# Patient Record
Sex: MALE | Race: WHITE | NOT HISPANIC OR LATINO | Employment: OTHER | URBAN - METROPOLITAN AREA
[De-identification: names, ages, dates, MRNs, and addresses within clinical notes are randomized per-mention and may not be internally consistent; named-entity substitution may affect disease eponyms.]

---

## 2018-02-10 ENCOUNTER — APPOINTMENT (OUTPATIENT)
Dept: GENERAL RADIOLOGY | Facility: CLINIC | Age: 53
End: 2018-02-10
Attending: EMERGENCY MEDICINE
Payer: COMMERCIAL

## 2018-02-10 ENCOUNTER — HOSPITAL ENCOUNTER (EMERGENCY)
Facility: CLINIC | Age: 53
Discharge: ANOTHER HOSPITAL | End: 2018-02-10
Attending: EMERGENCY MEDICINE | Admitting: EMERGENCY MEDICINE
Payer: COMMERCIAL

## 2018-02-10 ENCOUNTER — HOSPITAL ENCOUNTER (INPATIENT)
Facility: CLINIC | Age: 53
LOS: 3 days | Discharge: HOME | DRG: 247 | End: 2018-02-13
Attending: INTERNAL MEDICINE | Admitting: INTERNAL MEDICINE
Payer: COMMERCIAL

## 2018-02-10 VITALS
OXYGEN SATURATION: 96 % | HEIGHT: 69 IN | WEIGHT: 238.38 LBS | SYSTOLIC BLOOD PRESSURE: 142 MMHG | TEMPERATURE: 98.3 F | RESPIRATION RATE: 13 BRPM | HEART RATE: 73 BPM | DIASTOLIC BLOOD PRESSURE: 103 MMHG | BODY MASS INDEX: 35.31 KG/M2

## 2018-02-10 DIAGNOSIS — I24.9 ACS (ACUTE CORONARY SYNDROME) (H): ICD-10-CM

## 2018-02-10 DIAGNOSIS — I24.9 ACS (ACUTE CORONARY SYNDROME) (H): Primary | ICD-10-CM

## 2018-02-10 DIAGNOSIS — R03.0 ELEVATED BLOOD PRESSURE READING WITHOUT DIAGNOSIS OF HYPERTENSION: ICD-10-CM

## 2018-02-10 LAB
ALBUMIN SERPL-MCNC: 4 G/DL (ref 3.4–5)
ALP SERPL-CCNC: 70 U/L (ref 40–150)
ALT SERPL W P-5'-P-CCNC: 60 U/L (ref 0–70)
ANION GAP SERPL CALCULATED.3IONS-SCNC: 6 MMOL/L (ref 3–14)
AST SERPL W P-5'-P-CCNC: 37 U/L (ref 0–45)
BASOPHILS # BLD AUTO: 0.1 10E9/L (ref 0–0.2)
BASOPHILS NFR BLD AUTO: 0.8 %
BILIRUB SERPL-MCNC: 0.9 MG/DL (ref 0.2–1.3)
BUN SERPL-MCNC: 13 MG/DL (ref 7–30)
CALCIUM SERPL-MCNC: 9.2 MG/DL (ref 8.5–10.1)
CHLORIDE SERPL-SCNC: 107 MMOL/L (ref 94–109)
CO2 SERPL-SCNC: 25 MMOL/L (ref 20–32)
CREAT SERPL-MCNC: 0.72 MG/DL (ref 0.66–1.25)
DIFFERENTIAL METHOD BLD: NORMAL
EOSINOPHIL # BLD AUTO: 0.3 10E9/L (ref 0–0.7)
EOSINOPHIL NFR BLD AUTO: 5.3 %
ERYTHROCYTE [DISTWIDTH] IN BLOOD BY AUTOMATED COUNT: 12.8 % (ref 10–15)
ERYTHROCYTE [DISTWIDTH] IN BLOOD BY AUTOMATED COUNT: 13.3 % (ref 10–15)
GFR SERPL CREATININE-BSD FRML MDRD: >90 ML/MIN/1.7M2
GLUCOSE SERPL-MCNC: 103 MG/DL (ref 70–99)
HCT VFR BLD AUTO: 43.1 % (ref 40–53)
HCT VFR BLD AUTO: 47.8 % (ref 40–53)
HGB BLD-MCNC: 14.9 G/DL (ref 13.3–17.7)
HGB BLD-MCNC: 16.8 G/DL (ref 13.3–17.7)
IMM GRANULOCYTES # BLD: 0 10E9/L (ref 0–0.4)
IMM GRANULOCYTES NFR BLD: 0.3 %
INR PPP: 1.01 (ref 0.86–1.14)
LIPASE SERPL-CCNC: 141 U/L (ref 73–393)
LMWH PPP CHRO-ACNC: 0.26 IU/ML
LYMPHOCYTES # BLD AUTO: 2 10E9/L (ref 0.8–5.3)
LYMPHOCYTES NFR BLD AUTO: 31.8 %
MCH RBC QN AUTO: 29.9 PG (ref 26.5–33)
MCH RBC QN AUTO: 30.4 PG (ref 26.5–33)
MCHC RBC AUTO-ENTMCNC: 34.6 G/DL (ref 31.5–36.5)
MCHC RBC AUTO-ENTMCNC: 35.1 G/DL (ref 31.5–36.5)
MCV RBC AUTO: 87 FL (ref 78–100)
MCV RBC AUTO: 87 FL (ref 78–100)
MONOCYTES # BLD AUTO: 0.5 10E9/L (ref 0–1.3)
MONOCYTES NFR BLD AUTO: 8.3 %
NEUTROPHILS # BLD AUTO: 3.4 10E9/L (ref 1.6–8.3)
NEUTROPHILS NFR BLD AUTO: 53.5 %
NRBC # BLD AUTO: 0 10*3/UL
NRBC BLD AUTO-RTO: 0 /100
PLATELET # BLD AUTO: 218 10E9/L (ref 150–450)
PLATELET # BLD AUTO: 222 10E9/L (ref 150–450)
POTASSIUM SERPL-SCNC: 3.9 MMOL/L (ref 3.4–5.3)
PROT SERPL-MCNC: 8 G/DL (ref 6.8–8.8)
RBC # BLD AUTO: 4.98 10E12/L (ref 4.4–5.9)
RBC # BLD AUTO: 5.52 10E12/L (ref 4.4–5.9)
SODIUM SERPL-SCNC: 138 MMOL/L (ref 133–144)
TROPONIN I BLD-MCNC: 1.31 UG/L (ref 0–0.1)
TROPONIN I SERPL-MCNC: 1.71 UG/L (ref 0–0.04)
TROPONIN I SERPL-MCNC: 4.27 UG/L (ref 0–0.04)
WBC # BLD AUTO: 6.4 10E9/L (ref 4–11)
WBC # BLD AUTO: 8 10E9/L (ref 4–11)

## 2018-02-10 PROCEDURE — 96376 TX/PRO/DX INJ SAME DRUG ADON: CPT

## 2018-02-10 PROCEDURE — 99285 EMERGENCY DEPT VISIT HI MDM: CPT | Mod: 25

## 2018-02-10 PROCEDURE — 36415 COLL VENOUS BLD VENIPUNCTURE: CPT | Performed by: PHYSICIAN ASSISTANT

## 2018-02-10 PROCEDURE — 25000132 ZZH RX MED GY IP 250 OP 250 PS 637: Performed by: PHYSICIAN ASSISTANT

## 2018-02-10 PROCEDURE — 83690 ASSAY OF LIPASE: CPT | Performed by: EMERGENCY MEDICINE

## 2018-02-10 PROCEDURE — 85027 COMPLETE CBC AUTOMATED: CPT | Performed by: PHYSICIAN ASSISTANT

## 2018-02-10 PROCEDURE — 25000128 H RX IP 250 OP 636: Performed by: PHYSICIAN ASSISTANT

## 2018-02-10 PROCEDURE — 25000128 H RX IP 250 OP 636: Performed by: EMERGENCY MEDICINE

## 2018-02-10 PROCEDURE — 36415 COLL VENOUS BLD VENIPUNCTURE: CPT | Performed by: INTERNAL MEDICINE

## 2018-02-10 PROCEDURE — 84484 ASSAY OF TROPONIN QUANT: CPT | Performed by: EMERGENCY MEDICINE

## 2018-02-10 PROCEDURE — 93005 ELECTROCARDIOGRAM TRACING: CPT

## 2018-02-10 PROCEDURE — 25000132 ZZH RX MED GY IP 250 OP 250 PS 637: Performed by: EMERGENCY MEDICINE

## 2018-02-10 PROCEDURE — 96365 THER/PROPH/DIAG IV INF INIT: CPT

## 2018-02-10 PROCEDURE — 99207 ZZC MOONLIGHTING INDICATOR: CPT | Performed by: PHYSICIAN ASSISTANT

## 2018-02-10 PROCEDURE — 99207 ZZC APP CREDIT; MD BILLING SHARED VISIT: CPT | Performed by: PHYSICIAN ASSISTANT

## 2018-02-10 PROCEDURE — 96375 TX/PRO/DX INJ NEW DRUG ADDON: CPT

## 2018-02-10 PROCEDURE — 21000000 ZZH R&B IMCU HEART CARE

## 2018-02-10 PROCEDURE — 25000128 H RX IP 250 OP 636

## 2018-02-10 PROCEDURE — 85520 HEPARIN ASSAY: CPT | Performed by: INTERNAL MEDICINE

## 2018-02-10 PROCEDURE — 85610 PROTHROMBIN TIME: CPT | Performed by: EMERGENCY MEDICINE

## 2018-02-10 PROCEDURE — 93010 ELECTROCARDIOGRAM REPORT: CPT | Performed by: INTERNAL MEDICINE

## 2018-02-10 PROCEDURE — 71045 X-RAY EXAM CHEST 1 VIEW: CPT

## 2018-02-10 PROCEDURE — 99223 1ST HOSP IP/OBS HIGH 75: CPT | Mod: AI | Performed by: INTERNAL MEDICINE

## 2018-02-10 PROCEDURE — 96361 HYDRATE IV INFUSION ADD-ON: CPT

## 2018-02-10 PROCEDURE — 93005 ELECTROCARDIOGRAM TRACING: CPT | Mod: 76

## 2018-02-10 PROCEDURE — 85025 COMPLETE CBC W/AUTO DIFF WBC: CPT | Performed by: EMERGENCY MEDICINE

## 2018-02-10 PROCEDURE — 84484 ASSAY OF TROPONIN QUANT: CPT | Performed by: PHYSICIAN ASSISTANT

## 2018-02-10 PROCEDURE — 84484 ASSAY OF TROPONIN QUANT: CPT

## 2018-02-10 PROCEDURE — 96366 THER/PROPH/DIAG IV INF ADDON: CPT

## 2018-02-10 PROCEDURE — 80053 COMPREHEN METABOLIC PANEL: CPT | Performed by: EMERGENCY MEDICINE

## 2018-02-10 RX ORDER — MORPHINE SULFATE 2 MG/ML
4 INJECTION, SOLUTION INTRAMUSCULAR; INTRAVENOUS ONCE
Status: DISCONTINUED | OUTPATIENT
Start: 2018-02-10 | End: 2018-02-10 | Stop reason: HOSPADM

## 2018-02-10 RX ORDER — NITROGLYCERIN 20 MG/100ML
INJECTION INTRAVENOUS
Status: DISCONTINUED
Start: 2018-02-10 | End: 2018-02-10 | Stop reason: HOSPADM

## 2018-02-10 RX ORDER — ONDANSETRON 2 MG/ML
4 INJECTION INTRAMUSCULAR; INTRAVENOUS EVERY 6 HOURS PRN
Status: DISCONTINUED | OUTPATIENT
Start: 2018-02-10 | End: 2018-02-13 | Stop reason: HOSPADM

## 2018-02-10 RX ORDER — SODIUM CHLORIDE 9 MG/ML
1000 INJECTION, SOLUTION INTRAVENOUS CONTINUOUS
Status: DISCONTINUED | OUTPATIENT
Start: 2018-02-10 | End: 2018-02-10 | Stop reason: HOSPADM

## 2018-02-10 RX ORDER — NITROGLYCERIN 20 MG/100ML
.07-1.76 INJECTION INTRAVENOUS CONTINUOUS
Status: DISCONTINUED | OUTPATIENT
Start: 2018-02-10 | End: 2018-02-12

## 2018-02-10 RX ORDER — MORPHINE SULFATE 4 MG/ML
4 INJECTION, SOLUTION INTRAMUSCULAR; INTRAVENOUS ONCE
Status: COMPLETED | OUTPATIENT
Start: 2018-02-10 | End: 2018-02-10

## 2018-02-10 RX ORDER — LIDOCAINE 40 MG/G
CREAM TOPICAL
Status: DISCONTINUED | OUTPATIENT
Start: 2018-02-10 | End: 2018-02-12

## 2018-02-10 RX ORDER — NALOXONE HYDROCHLORIDE 0.4 MG/ML
.1-.4 INJECTION, SOLUTION INTRAMUSCULAR; INTRAVENOUS; SUBCUTANEOUS
Status: DISCONTINUED | OUTPATIENT
Start: 2018-02-10 | End: 2018-02-12

## 2018-02-10 RX ORDER — MORPHINE SULFATE 2 MG/ML
1 INJECTION, SOLUTION INTRAMUSCULAR; INTRAVENOUS
Status: DISCONTINUED | OUTPATIENT
Start: 2018-02-10 | End: 2018-02-12

## 2018-02-10 RX ORDER — NITROGLYCERIN 0.4 MG/1
0.4 TABLET SUBLINGUAL
Status: COMPLETED | OUTPATIENT
Start: 2018-02-10 | End: 2018-02-10

## 2018-02-10 RX ORDER — ALUMINA, MAGNESIA, AND SIMETHICONE 2400; 2400; 240 MG/30ML; MG/30ML; MG/30ML
30 SUSPENSION ORAL EVERY 4 HOURS PRN
Status: DISCONTINUED | OUTPATIENT
Start: 2018-02-10 | End: 2018-02-13 | Stop reason: HOSPADM

## 2018-02-10 RX ORDER — ATORVASTATIN CALCIUM 40 MG/1
40 TABLET, FILM COATED ORAL DAILY
Status: DISCONTINUED | OUTPATIENT
Start: 2018-02-10 | End: 2018-02-13

## 2018-02-10 RX ORDER — PROCHLORPERAZINE MALEATE 5 MG
10 TABLET ORAL EVERY 6 HOURS PRN
Status: DISCONTINUED | OUTPATIENT
Start: 2018-02-10 | End: 2018-02-13 | Stop reason: HOSPADM

## 2018-02-10 RX ORDER — ASPIRIN 81 MG/1
324 TABLET, CHEWABLE ORAL ONCE
Status: COMPLETED | OUTPATIENT
Start: 2018-02-10 | End: 2018-02-10

## 2018-02-10 RX ORDER — MORPHINE SULFATE 2 MG/ML
INJECTION, SOLUTION INTRAMUSCULAR; INTRAVENOUS
Status: COMPLETED
Start: 2018-02-10 | End: 2018-02-10

## 2018-02-10 RX ORDER — AMOXICILLIN 250 MG
1 CAPSULE ORAL 2 TIMES DAILY PRN
Status: DISCONTINUED | OUTPATIENT
Start: 2018-02-10 | End: 2018-02-13 | Stop reason: HOSPADM

## 2018-02-10 RX ORDER — ASPIRIN 325 MG
325 TABLET ORAL DAILY
Status: DISCONTINUED | OUTPATIENT
Start: 2018-02-11 | End: 2018-02-12

## 2018-02-10 RX ORDER — AMOXICILLIN 250 MG
2 CAPSULE ORAL 2 TIMES DAILY PRN
Status: DISCONTINUED | OUTPATIENT
Start: 2018-02-10 | End: 2018-02-13 | Stop reason: HOSPADM

## 2018-02-10 RX ORDER — ONDANSETRON 4 MG/1
4 TABLET, ORALLY DISINTEGRATING ORAL EVERY 6 HOURS PRN
Status: DISCONTINUED | OUTPATIENT
Start: 2018-02-10 | End: 2018-02-13 | Stop reason: HOSPADM

## 2018-02-10 RX ORDER — ASPIRIN 81 MG/1
324 TABLET, CHEWABLE ORAL ONCE
Status: DISCONTINUED | OUTPATIENT
Start: 2018-02-10 | End: 2018-02-10

## 2018-02-10 RX ORDER — PROCHLORPERAZINE 25 MG
25 SUPPOSITORY, RECTAL RECTAL EVERY 12 HOURS PRN
Status: DISCONTINUED | OUTPATIENT
Start: 2018-02-10 | End: 2018-02-13 | Stop reason: HOSPADM

## 2018-02-10 RX ORDER — ACETAMINOPHEN 650 MG/1
650 SUPPOSITORY RECTAL EVERY 4 HOURS PRN
Status: DISCONTINUED | OUTPATIENT
Start: 2018-02-10 | End: 2018-02-13 | Stop reason: HOSPADM

## 2018-02-10 RX ORDER — METOPROLOL TARTRATE 25 MG/1
25 TABLET, FILM COATED ORAL 2 TIMES DAILY
Status: DISCONTINUED | OUTPATIENT
Start: 2018-02-10 | End: 2018-02-13

## 2018-02-10 RX ORDER — LISINOPRIL 2.5 MG/1
2.5 TABLET ORAL DAILY
Status: DISCONTINUED | OUTPATIENT
Start: 2018-02-10 | End: 2018-02-13 | Stop reason: HOSPADM

## 2018-02-10 RX ORDER — ACETAMINOPHEN 325 MG/1
650 TABLET ORAL EVERY 4 HOURS PRN
Status: DISCONTINUED | OUTPATIENT
Start: 2018-02-10 | End: 2018-02-12

## 2018-02-10 RX ADMIN — NITROGLYCERIN 0.4 MG: 0.4 TABLET SUBLINGUAL at 13:46

## 2018-02-10 RX ADMIN — NITROGLYCERIN 0.07 MCG/KG/MIN: 20 INJECTION INTRAVENOUS at 21:03

## 2018-02-10 RX ADMIN — ATORVASTATIN CALCIUM 40 MG: 40 TABLET, FILM COATED ORAL at 21:04

## 2018-02-10 RX ADMIN — MORPHINE SULFATE 2 MG: 2 INJECTION, SOLUTION INTRAMUSCULAR; INTRAVENOUS at 14:46

## 2018-02-10 RX ADMIN — ASPIRIN 81 MG 324 MG: 81 TABLET ORAL at 13:18

## 2018-02-10 RX ADMIN — MORPHINE SULFATE 4 MG: 4 INJECTION INTRAVENOUS at 13:43

## 2018-02-10 RX ADMIN — HEPARIN SODIUM 1050 UNITS/HR: 10000 INJECTION, SOLUTION INTRAVENOUS at 14:04

## 2018-02-10 RX ADMIN — METOPROLOL TARTRATE 25 MG: 25 TABLET ORAL at 21:04

## 2018-02-10 RX ADMIN — LISINOPRIL 2.5 MG: 2.5 TABLET ORAL at 19:04

## 2018-02-10 RX ADMIN — NITROGLYCERIN 0.4 MG: 0.4 TABLET SUBLINGUAL at 13:38

## 2018-02-10 RX ADMIN — Medication 5150 UNITS: at 14:04

## 2018-02-10 RX ADMIN — NITROGLYCERIN 0.4 MG: 0.4 TABLET SUBLINGUAL at 13:21

## 2018-02-10 RX ADMIN — SODIUM CHLORIDE 1000 ML: 9 INJECTION, SOLUTION INTRAVENOUS at 13:18

## 2018-02-10 RX ADMIN — SODIUM CHLORIDE 1000 ML: 9 INJECTION, SOLUTION INTRAVENOUS at 14:06

## 2018-02-10 ASSESSMENT — ENCOUNTER SYMPTOMS
WEAKNESS: 1
SHORTNESS OF BREATH: 1
NAUSEA: 0

## 2018-02-10 NOTE — ED PROVIDER NOTES
"  History     Chief Complaint:  Chest Pain and Shortness of Breath      HPI   Lavell Mercer is a 52 year old male who presents with chest pain and shortness of breath. The patient reports that two days ago in the morning, he began feeling chest discomfort, which he describes as \"muscle tightness\".  He also felt like he was getting sick. The symptoms then resolved a couple of hours later.  Then yesterday, he felt fatigued and mildly congested. This morning, at 0700 he awoke to chest pains. The chest pain remained constant, with sudden episodes of sharp pain, with no aggravating or alleviating factors. Around 0930, he tried to open a top of bottle to prepare a shake when he felt his left arm was weak. He was still able to open the top and was still able to  with his left hand. He did not notice any numbness in his arm, and after several minutes the weakness resolved. He then felt increased fatigue, shortness of breath and \"warm\", although he did not experience any diaphoresis. He was helping family members move a chair and he noticed his chest pain was worsened after that. His continued chest pain and shortness of breath prompted today's visit to the emergency department.    Here now in the emergency department, he reports continued chest pain. He denies nausea. He also denies any recent falls or recent travel. Of note, he started a diet about 2 weeks ago and has lost 16 pounds.      Allergies:  NKDA     Medications:    The patient is currently on no regular medications.      Past Medical History:    The patient denies any significant past medical history.    Past Surgical History:    The patient does not have any pertinent past surgical history  Family History:    No past pertinent family history.     Social History:  The patient presents with his wife.   Marital Status:   [2]    Review of Systems   Respiratory: Positive for shortness of breath.    Cardiovascular: Positive for chest pain. " "  Gastrointestinal: Negative for nausea.   Neurological: Positive for weakness (Resolved: left arm weakness).   All other systems reviewed and are negative.      Physical Exam   First Vitals:  BP (!) 134/98  Pulse 73  Temp 98.3  F (36.8  C) (Oral)  Resp 16  Ht 1.753 m (5' 9\")  Wt 108.1 kg (238 lb 6 oz)  SpO2 93%  BMI 35.2 kg/m2    Physical Exam  General: The patient is alert, in no respiratory distress.    HENT: Mucous membranes moist.    Cardiovascular: Regular rate and rhythm. Good pulses in all four extremities. Normal capillary refill and skin turgor.     Respiratory: Lungs are clear. No nasal flaring. No retractions. No wheezing, no crackles.    Gastrointestinal: Abdomen soft. No guarding, no rebound. No palpable hernias. No abdominal tenderness.     Musculoskeletal: No gross deformity. Pain with taking a deep breath.    Skin: No rashes or petechiae.     Neurologic: The patient is alert and oriented x3. GCS 15. No testable cranial nerve deficit. Follows commands with clear and appropriate speech. Gives appropriate answers. Good strength in all extremities. No gross neurologic deficit. Gross sensation intact. Pupils are round and reactive. No meningismus.     Lymphatic: No cervical adenopathy. No lower extremity swelling.    Psychiatric: The patient is non-tearful.    Emergency Department Course   ECG:  Indication: Chest Pain   Time: 1245  Vent. Rate 76 bpm. SD interval 196 ms. QRS duration 104 ms. QT/QTc 376/423 ms. P-R-T axis 47 -31 5. Normal sinus rhythm. Left axis deviation. Inferior infarct, age undetermined. Abnormal ECG. Read time: 1247    Indication: Repeat  Time: 73  Vent. Rate 182 ms bpm. SD interval 104 ms. QRS duration 402/442 ms. QT/QTc 11. P-R-T axis -36 4 Normal sinus rhythm. Left axis deviation. Inferior infarct, age undetermined. Abnormal ECG. Stable. Read time: 1340    Indication: Repeat.   Time: 1400  Vent. Rate 56 bpm. SD interval 190 ms. QRS duration 106 ms. QT/QTc 450/434 ms. P-R-T " axis 52 -21 2. Sinus bradycardia. Inferior infarct, age undetermined. Abnormal ECG. Read time: 1405.     Imaging:  Radiographic findings were communicated with the patient and family who voiced understanding of the findings.    XR Chest Portable 1 view:   Negative. As per radiology.     Laboratory:  1321 Troponin POCT: 1.31 ()  1325 INR: 1.01   Troponin I: 1.708 ()    CBC: WBC: 6.4, HGB: 16.8, PLT: 222  CMP: Glucose 130 (H) o/w WNL (Creatinine: 0.72)  Lipase: 141    Interventions:  1318 NS 1L IV   Aspirin, 324 mg, PO  1343 Morphine, 4 mg, IV  1346 Nitrostat, 0.4 mg, Sublingual  1404 Heparin Infusion, 1050 units/hr, IV   Heparin, 5150 units, IV  1406 NS 1L IV  1446 Morphine, 2 mg, IV    Emergency Department Course:  Nursing notes and vitals reviewed. 1250 I performed an exam of the patient as documented above.     IV inserted. Medicine administered as documented above. Blood drawn. This was sent to the lab for further testing, results above.    EKG x 3 obtained in the ED, see results above.     The patient was sent for a Chest XR, Portable while in the emergency department, findings above.     1333 I rechecked the patient and discussed the results of his workup thus far. I discussed his elevated Troponin. He is 5/10 on the pain scale.    1415 I reevaluated the patient and provided an update in regards to his ED course. He reports he is 1.5/10 on the pain scale.      1457 I performed another recheck.     1505 I consulted with Dr. Ashley, Cardiologist, regarding the patient's history and presentation here in the emergency department. He recommended the patient for a transfer.     1508 I again rechecked the patient and updated him regarding his ED course.     1522 I consulted with Dr. David, Cardiologist, regarding the patient's history and presentation here in the emergency department. He would not cath emergently.     1530 I consulted with Dr. Sandy of the hospitalist services at [facility]. They are  "in agreement to accept the patient for transfer.     Findings and plan explained to the Patient who consents to transfer to [facility].     Impression & Plan      Medical Decision Making:  The patient has few medical problems in his history but did report an episode of pain two days ago with what he said he felt like was the flu or \"crud\". The patient's pain has since come back and sounds very persistent. The fact that it was worse with a deep breath and he was able to move furniture without increased pain made me think it was less likely to be ACS. However, lead three on the EKG showed less than a box of elevation and was potentially suspicious for a problem. I discussed with the pain having been there almost six hours, that I would expect more significant EKG changes but did embark on a work-up and his Troponin did come back elevated. I therefore did treatment aggressively with nitroglycerin however when dosed with morphine this did drop his pressure. I therefore have waited for further nitroglycerin and he appeared to be quite sensitive to it. With morphine his pain was almost completely gone. The EKG were performed serially and did not show any significant elevation. I discussed the case with the cardiologist here at [facility], Dr. Ashley as well as Dr. David at [facility]. Dr. Ashley recommended transfer if the pain was not completely gone and that he may need emergent cardiac catheterization. Dr. David felt that immediate catheterization was not needed but did agree to be involved in the patient's care. The case was discussed with the hospitalist at [facility] and the patient was transferred with no current signs of ST elevation MI. There is some elevation that does appear to meet criteria but does not decrease significantly with treatment. His pain almost completely gone and he was transferred to [facility] mainly because they have a cath lab available should he become a candidate for an emergent cath if " needed and the recommendation of the cardiologist.      Critical Care time:  was 50 minutes for this patient excluding procedures.    Diagnosis:    ICD-10-CM   1. ACS (acute coronary syndrome) (H) I24.9   2. Elevated blood pressure reading without diagnosis of hypertension R03.0       Disposition:  Transfer to Dr. Sandy, hospitalist at [facility].     I, Estephanie Torres, am serving as a scribe on 2/10/2018 at 12:42 PM to personally document services performed by Flo Blake MD based on my observations and the provider's statements to me.       Estephanie Torres  2/10/2018   [facility] EMERGENCY DEPARTMENT       Flo Blake MD  02/11/18 8970

## 2018-02-10 NOTE — IP AVS SNAPSHOT
MRN:[id number]                      After Visit Summary   2/10/2018    Lavell Mercer    MRN: [id number]           Thank you!     Thank you for choosing [facility] for your care. Our goal is always to provide you with excellent care. Hearing back from our patients is one way we can continue to improve our services. Please take a few minutes to complete the written survey that you may receive in the mail after you visit with us. Thank you!        Patient Information     Date Of Birth          1965        Designated Caregiver       Most Recent Value    Caregiver    Will someone help with your care after discharge? yes    Name of designated caregiver Angeles Mercer    Phone number of caregiver [phone]    Caregiver address [address], [address]      About your hospital stay     You were [facility] on:  February 10, 2018 [facility]    You were discharged on:  February 13, 2018        Reason for your hospital stay       Coronary artery disease.                  Who to Call     For medical emergencies, please call 911.  For non-urgent questions about your medical care, please call your primary care provider or clinic, [phone]          Attending Provider     Provider Specialty    Altaf Guerin MD Internal Medicine       Primary Care Provider Office Phone # Fax #    Hipolito Le [phone]      After Care Instructions     Activity       Your activity upon discharge: activity as tolerated            Diet       Follow this diet upon discharge: Orders Placed This Encounter      Low Saturated Fat Na <2400 mg                  Follow-up Appointments     Follow-up and recommended labs and tests        Follow up with primary care provider, Hipolito Le, within 7 days for hospital follow- up.  The following labs/tests are recommended: cbc/bmp.    Follow up with Cardiology as directed.            Follow-up and  recommended labs and tests        Please follow-up with your Primary Care Physician:  Hipolito Lincoln   [phone] [facility] [address] [address] [facility]  Tuesday, February 20, 2018  10:15 AM                  Your next 10 appointments already scheduled     Feb 20, 2018 10:15 AM CST   Cardiac Evaluation with  Cardiac Rehab 1   [facility] ([facility])    [address], [address]  [place] [id number] [phone]            Feb 27, 2018 11:00 AM CST   Return Discharge with Raeann Mccurdy PA-C   [facility] ([facility])    [address]  [place] [address] [phone]              Additional Services     CARDIAC REHAB REFERRAL       Patient may choose their preference of the site for Cardiac Rehab.            Follow-Up with Cardiac Advanced Practice Provider                 Further instructions from your care team       PLEASE REVIEW YOUR CORONARY ANGIOGRAM, ANGIOPLASTY AND STENT PLACEMENT BOOKLET AND YOUR HEART MEDICATION HANDOUT.    Pending Results     Date and Time Order Name Status Description    2/12/2018 1827 EKG 12-lead, tracing only Preliminary     2/12/2018 1516 EKG 12-lead, tracing only  Preliminary     2/12/2018 0858 EKG 12-lead, tracing only Preliminary     2/10/2018 1839 EKG 12-lead, tracing only Preliminary             Statement of Approval     Ordered          02/13/18 1148  I have reviewed and agree with all the recommendations and orders detailed in this document.  EFFECTIVE NOW     Approved and electronically signed by:  Bunny Aguiar DO             Admission Information     Date & Time Provider Department Dept. Phone    2/10/2018 Altaf Guerin MD [facility] [phone]      Your Vitals Were     Blood Pressure Temperature Respirations Height Weight Pulse Oximetry    135/88 98.6  F (37  C) (Oral) 16 1.753 m (5'  "9\") 111.6 kg (246 lb 1.6 oz) 98%    BMI (Body Mass Index)                   36.34 kg/m2           My Health Direct Information     My Health Direct lets you send messages to your doctor, view your test results, renew your prescriptions, schedule appointments and more. To sign up, go to [email or web address] . Click on \"Log in\" on the left side of the screen, which will take you to the Welcome page. Then click on \"Sign up Now\" on the right side of the page.     You will be asked to enter the access code listed below, as well as some personal information. Please follow the directions to create your username and password.     Your access code is: [email or web address]  Expires: 2018  7:44 AM     Your access code will  in 90 days. If you need help or a new code, please call your [place] clinic or [phone].        Care EveryWhere ID     This is your Care EveryWhere ID. This could be used by other organizations to access your [place] medical records  [id number]        Equal Access to Services     [facility] : Hadii hai Ascencio, waalicia pace, qachris castanoalmariela domínguez, diogo krishnan. So [facility] [phone].    ATENCIÓN: Si habla español, tiene a curry disposición servicios gratuitos de asistencia lingüística. Mya al [phone].    We comply with applicable federal civil rights laws and [place] laws. We do not discriminate on the basis of race, color, national origin, age, disability, sex, sexual orientation, or gender identity.               Review of your medicines      START taking        Dose / Directions    aspirin 81 MG EC tablet        Dose:  81 mg   Start taking on:  2018   Take 1 tablet (81 mg) by mouth daily   Quantity:  30 tablet   Refills:  0       atorvastatin 20 MG tablet   Commonly known as:  LIPITOR        Dose:  20 mg   Take 1 tablet (20 mg) by mouth daily   Quantity:  30 tablet   Refills:  0       clopidogrel 75 MG tablet   Commonly known as:  PLAVIX        " Dose:  75 mg   Start taking on:  2/14/2018   Take 1 tablet (75 mg) by mouth daily   Quantity:  30 tablet   Refills:  0       lisinopril 2.5 MG tablet   Commonly known as:  PRINIVIL/Zestril        Dose:  2.5 mg   Start taking on:  2/14/2018   Take 1 tablet (2.5 mg) by mouth daily   Quantity:  30 tablet   Refills:  0       metoprolol tartrate 25 MG tablet   Commonly known as:  LOPRESSOR        Dose:  12.5 mg   Take 0.5 tablets (12.5 mg) by mouth 2 times daily   Quantity:  30 tablet   Refills:  0       nitroGLYcerin 0.4 MG sublingual tablet   Commonly known as:  NITROSTAT        For chest pain place 1 tablet under the tongue every 5 minutes for 3 doses. If symptoms persist 5 minutes after 1st dose call 911.   Quantity:  25 tablet   Refills:  0            Where to get your medicines      These medications were sent to [facility] - [address]  [address] [facility] [address], [place] [address]     Phone:  [phone]     aspirin 81 MG EC tablet    atorvastatin 20 MG tablet    clopidogrel 75 MG tablet    lisinopril 2.5 MG tablet    metoprolol tartrate 25 MG tablet    nitroGLYcerin 0.4 MG sublingual tablet                Protect others around you: Learn how to safely use, store and throw away your medicines at www.disposemymeds.org.             Medication List: This is a list of all your medications and when to take them. Check marks below indicate your daily home schedule. Keep this list as a reference.      Medications           Morning Afternoon Evening Bedtime As Needed    aspirin 81 MG EC tablet   Take 1 tablet (81 mg) by mouth daily   Start taking on:  2/14/2018   Last time this was given:  81 mg on 2/13/2018  8:21 AM   Next Dose Due:  02/14                                   atorvastatin 20 MG tablet   Commonly known as:  LIPITOR   Take 1 tablet (20 mg) by mouth daily   Last time this was given:  40 mg on 2/12/2018  9:39 PM   Next Dose Due:  02/14                                    clopidogrel 75 MG tablet   Commonly known as:  PLAVIX   Take 1 tablet (75 mg) by mouth daily   Start taking on:  2/14/2018   Last time this was given:  75 mg on 2/13/2018  8:21 AM                                   lisinopril 2.5 MG tablet   Commonly known as:  PRINIVIL/Zestril   Take 1 tablet (2.5 mg) by mouth daily   Start taking on:  2/14/2018   Last time this was given:  2.5 mg on 2/13/2018  8:21 AM                                   metoprolol tartrate 25 MG tablet   Commonly known as:  LOPRESSOR   Take 0.5 tablets (12.5 mg) by mouth 2 times daily   Last time this was given:  25 mg on 2/12/2018  8:32 AM   Next Dose Due:  02/13                                   nitroGLYcerin 0.4 MG sublingual tablet   Commonly known as:  NITROSTAT   For chest pain place 1 tablet under the tongue every 5 minutes for 3 doses. If symptoms persist 5 minutes after 1st dose call 911.

## 2018-02-10 NOTE — H&P
[facility]    History and Physical  Hospitalist       Date of Admission:  2/10/2018      Assessment & Plan   Lavell Mercer is a generally healthy 52 year old male who was transferred from [facility] for treatment of a NSTEMI. Troponin 1.31---1.708. Initial EKG with ST elevation in anterior and lateral leads. Subsequent EKG with improvement.  Given 3 doses of SL NTG. When given 3rd dose with morphine became hypotensive (SBP 70s).  Transferred to [facility] for cardiology consult and angiogram.    Summary:    -NSTEMI: Chest pain has been ongoing for two days. Peak troponin 1.708. EKG ST elevation in anterior and lateral leads. Subsequent EKG with improvement.. CXR unremarkable. Started on IV heparin.SBP dropped into 70s after being given SL NTG and morphine. Transferred to [facility] for angiogram.    -Serial troponin, will recheck now. Noting slight worsening of chest pressure.  If upward trending will consult cardiology tonight. If stable, will trial low dose IV NTG infusion.   -IV heparin   -Cardiology consult   -Echocardiogram   -ASA, BB, ACEi, and statin   -Risk factor modification: Check FLP and hgA1c      ADDENDUM: Troponin attila to 4.274. Repeat EKG shows t-wave inversion in inferior leads (unchanged from last). Will continue IV Heparin. Start IV NTG.   If ongoing pain despite titration or ongoing issues with hypotension will consult cardiology this evening. Discussed with RN and Dr. Guerin.    DVT Prophylaxis: IV heparin  Code Status: Full Code (confirmed with wife and RN at bedside)    Disposition: Expected discharge in 2-3 days once cardiac evaluation completed.      Sudha Hanson PA-C  Pager [phone]     This patient was discussed with Dr. Guerin of the Hospitalist Service who agrees with current plans as outlined above.   Inpatient hospital stay recommended given greater than 2 midnight stay and NSTEMI.    Primary Care Physician   Hipolito Le    Chief Complaint    NSTEMI    History is obtained from the patient and outside records    History of Present Illness   Lavell Mecrer is a generally healthy 52 year old male who presented to [facility] ER for evaluation of chest pain. On Thursday morning, he states that he felt unwell. He had some chest pressure that resolve on its own after two hours. Yesterday he again had similar symptoms of feeling unwell that resolved on their own. This morning he woke up with sudden onset of chest pressure and intermittent stabbing episodes of pain.   He went to sleep upright in the chair with no improvement. He then tried to eat and use the restroom. He noted decreased strength in the left arm but not radiating pain. He felt unwell and short of breath. Denies nausea, vomiting, or diaphoresis.  He noted multiple episodes of worsening of his pain with exertion.  He attempted to move a chair with his son but the symptoms worsened. After sitting down to rest they improved.     Upon presentation to [facility], he was having 5/10 chest pressure. Initial SL NTG had minimal change. The second SL NTG improved his pain to 3/10. His troponin returned positive.  He was a given a 3 SL NTG and IV morphine and his systolic blood pressure dropped into the 70s.  His pain improved to a  1.5/10. At that time cardiology was consulted because the initial EKG revealed anterior and lateral ST changes. The repeat ekg showed improvement.  It was recommended that the patient transfer to [facility] in the event an emergent angiogram is needed. The patient was treated with IV heparin.    Upon arrival he noted a mild increase in the pressure to 2/10.  He is not having any stabbing pain. He does state the pain is worse with expiration. Early today he thought he had reproducible chest wall pain on the left but it is not currently present. He has no associated sob, diaphoresis or nausea.    He denies any history of diabetes, hypertension or hyperlipidemia. He gained 50 pounds  following a knee replacement a few years ago. He started a new diet a few weeks ago (protein shakes only, no supplements) and has lost 16 pounds. He previously smoked but quit in 1998. He has no family history of CAD, CVA or cancer.    He and his family had been dealing with ongoing upper respiratory illness since the Suz[place] holiday. He noted he got sick on 12/26 and was started on a 10 day course of antibiotics on 1/2.  His symptoms finally improved approximatly 10 days ago.    Past Medical History    I have reviewed this patient's medical history and updated it with pertinent information if needed.   None    Past Surgical History   I have reviewed this patient's surgical history and updated it with pertinent information if needed.  Past Surgical History:   Procedure Laterality Date     JOINT REPLACEMENT         Prior to Admission Medications   Not currently on any PTA medications     Allergies   No Known Allergies    Social History   I have reviewed this patient's social history and updated it with pertinent information if needed. Lavell Mercer  reports that he quit smoking about 20 years ago. His smoking use included Cigarettes. He does not have any smokeless tobacco history on file. He reports that he drinks alcohol.   previous tobacco use but quit in 1998, rare alcohol use  Family History   I have reviewed this patient's family history and updated it with pertinent information if needed.   No contributory family history    Review of Systems   The 10 point Review of Systems is negative other than noted in the HPI or here.     Physical Exam   Temp: 98.5  F (36.9  C) Temp src: Oral BP: 136/89   Heart Rate: 78 Resp: 16 SpO2: 95 % O2 Device: None (Room air)    Vital Signs with Ranges  Temp:  [98.3  F (36.8  C)-98.5  F (36.9  C)] 98.5  F (36.9  C)  Pulse:  [73] 73  Heart Rate:  [54-80] 78  Resp:  [13-39] 16  BP: (111-153)/() 136/89  SpO2:  [92 %-99 %] 95 %  250 lbs 9.6 oz    Constitutional: Alert  and oriented x3, no acute distress  Eyes: PERRL, EOMs intact  HEENT: patent nares, supple neck  Respiratory: clear to auscultation  Cardiovascular: regular rate and rhythm, no murmurs, no edema, pedal pulses present, no reproducible chest wall tenderness  GI: soft, non tender, non distended, bowel sounds present  Lymph/Hematologic: no evidence of jaundice or bruising  Genitourinary: deferred  Skin: warm and dry, no rashes  Musculoskeletal: able to move all extremities  Neurologic: no focal neurologic deficits, gait not examined  Psychiatric: affect normal, answers questions appropriately    Data   Data reviewed today:  I personally reviewed the EKG tracing showing see HPI (ST segment changes).    Recent Labs  Lab 02/10/18  1823 02/10/18  1325 02/10/18  1321   WBC 8.0 6.4  --    HGB 14.9 16.8  --    MCV 87 87  --     222  --    INR  --  1.01  --    NA  --  138  --    POTASSIUM  --  3.9  --    CHLORIDE  --  107  --    CO2  --  25  --    BUN  --  13  --    CR  --  0.72  --    ANIONGAP  --  6  --    BRAN  --  9.2  --    GLC  --  103*  --    ALBUMIN  --  4.0  --    PROTTOTAL  --  8.0  --    BILITOTAL  --  0.9  --    ALKPHOS  --  70  --    ALT  --  60  --    AST  --  37  --    LIPASE  --  141  --    TROPI 4.274* 1.708*  --    TROPONIN  --   --  1.31*       Imaging:  Recent Results (from the past 24 hour(s))   XR Chest Port 1 View    Narrative    CHEST PORTABLE ONE VIEW   2/10/2018 2:14 PM     HISTORY: Chest pain.    COMPARISON: None.    FINDINGS: Heart size normal. Lungs clear.      Impression    IMPRESSION: Negative.    CHRISTOPHER SOUSA MD

## 2018-02-10 NOTE — ED NOTES
Chest pain that started Thursday; described as tightness. Pain resolved, but patient noted has not been feeling right; tired felt like he was getting ill. Return of pain today at time of waking at 0700 and continued with shortness of breath. Denies that anything he does makes symptoms better or worse.

## 2018-02-10 NOTE — IP AVS SNAPSHOT
[facility]    [address]    [facility] [address]    Phone:  [phone]                                       After Visit Summary   2/10/2018    Lavell Mercer    MRN: [id number]           After Visit Summary Signature Page     I have received my discharge instructions, and my questions have been answered. I have discussed any challenges I see with this plan with the nurse or doctor.    ..........................................................................................................................................  Patient/Patient Representative Signature      ..........................................................................................................................................  Patient Representative Print Name and Relationship to Patient    ..................................................               ................................................  Date                                            Time    ..........................................................................................................................................  Reviewed by Signature/Title    ...................................................              ..............................................  Date                                                            Time

## 2018-02-11 ENCOUNTER — APPOINTMENT (OUTPATIENT)
Dept: CARDIOLOGY | Facility: CLINIC | Age: 53
DRG: 247 | End: 2018-02-11
Attending: PHYSICIAN ASSISTANT
Payer: COMMERCIAL

## 2018-02-11 LAB
CHOLEST SERPL-MCNC: 186 MG/DL
HBA1C MFR BLD: 5.1 % (ref 4.3–6)
HDLC SERPL-MCNC: 24 MG/DL
INTERPRETATION ECG - MUSE: NORMAL
LDLC SERPL CALC-MCNC: 87 MG/DL
LMWH PPP CHRO-ACNC: 0.2 IU/ML
NONHDLC SERPL-MCNC: 162 MG/DL
TRIGL SERPL-MCNC: 373 MG/DL
TROPONIN I SERPL-MCNC: 6.82 UG/L (ref 0–0.04)
TROPONIN I SERPL-MCNC: 7.66 UG/L (ref 0–0.04)

## 2018-02-11 PROCEDURE — 99222 1ST HOSP IP/OBS MODERATE 55: CPT | Mod: 25 | Performed by: INTERNAL MEDICINE

## 2018-02-11 PROCEDURE — 25000128 H RX IP 250 OP 636

## 2018-02-11 PROCEDURE — 93306 TTE W/DOPPLER COMPLETE: CPT

## 2018-02-11 PROCEDURE — 99232 SBSQ HOSP IP/OBS MODERATE 35: CPT | Performed by: INTERNAL MEDICINE

## 2018-02-11 PROCEDURE — 84484 ASSAY OF TROPONIN QUANT: CPT | Performed by: PHYSICIAN ASSISTANT

## 2018-02-11 PROCEDURE — 93306 TTE W/DOPPLER COMPLETE: CPT | Mod: 26 | Performed by: INTERNAL MEDICINE

## 2018-02-11 PROCEDURE — 83036 HEMOGLOBIN GLYCOSYLATED A1C: CPT | Performed by: PHYSICIAN ASSISTANT

## 2018-02-11 PROCEDURE — 25000132 ZZH RX MED GY IP 250 OP 250 PS 637: Performed by: PHYSICIAN ASSISTANT

## 2018-02-11 PROCEDURE — 99207 ZZC CDG-MDM COMPONENT: MEETS MODERATE - UP CODED: CPT | Performed by: INTERNAL MEDICINE

## 2018-02-11 PROCEDURE — 80061 LIPID PANEL: CPT | Performed by: PHYSICIAN ASSISTANT

## 2018-02-11 PROCEDURE — 36415 COLL VENOUS BLD VENIPUNCTURE: CPT | Performed by: PHYSICIAN ASSISTANT

## 2018-02-11 PROCEDURE — 85520 HEPARIN ASSAY: CPT | Performed by: PHYSICIAN ASSISTANT

## 2018-02-11 PROCEDURE — 21000000 ZZH R&B IMCU HEART CARE

## 2018-02-11 PROCEDURE — 25500064 ZZH RX 255 OP 636: Performed by: INTERNAL MEDICINE

## 2018-02-11 RX ADMIN — METOPROLOL TARTRATE 25 MG: 25 TABLET ORAL at 09:06

## 2018-02-11 RX ADMIN — SULFUR HEXAFLUORIDE 5 ML: KIT at 15:21

## 2018-02-11 RX ADMIN — HEPARIN SODIUM 1050 UNITS/HR: 10000 INJECTION, SOLUTION INTRAVENOUS at 09:04

## 2018-02-11 RX ADMIN — LISINOPRIL 2.5 MG: 2.5 TABLET ORAL at 09:06

## 2018-02-11 RX ADMIN — ATORVASTATIN CALCIUM 40 MG: 40 TABLET, FILM COATED ORAL at 21:19

## 2018-02-11 RX ADMIN — ASPIRIN 325 MG ORAL TABLET 325 MG: 325 PILL ORAL at 09:06

## 2018-02-11 RX ADMIN — ACETAMINOPHEN 650 MG: 325 TABLET, FILM COATED ORAL at 00:59

## 2018-02-11 RX ADMIN — ACETAMINOPHEN 650 MG: 325 TABLET, FILM COATED ORAL at 23:48

## 2018-02-11 RX ADMIN — METOPROLOL TARTRATE 25 MG: 25 TABLET ORAL at 21:19

## 2018-02-11 ASSESSMENT — PAIN DESCRIPTION - DESCRIPTORS: DESCRIPTORS: ACHING

## 2018-02-11 NOTE — PROVIDER NOTIFICATION
MD Notification    Notified Person:  MD    Notified Persons Name: Ana Maríamaye MD    Notification Date/Time:  2/11/2018 at 01:15    Notification Interaction:  Talked with Physician    Purpose of Notification:  Troponin level rising; Courtesy call per Dr. Sheridan    Orders Received:  Continue to monitor    Comments:

## 2018-02-11 NOTE — PLAN OF CARE
Problem: Patient Care Overview  Goal: Plan of Care/Patient Progress Review  OT/CR: Order received, chart reviewed, eval attempted. Pt admitted for chest pain. Awaiting cardiology consult with possible angio. RN in agreement to hold OT/CR today.

## 2018-02-11 NOTE — PLAN OF CARE
"Problem: Patient Care Overview  Goal: Plan of Care/Patient Progress Review  Outcome: No Change  Transferred from [facility] at 1745 via ambulance. A/OX4. \"tightness\" on L lower chest. Heparin drip at 10.5ml. Recheck Hep 10a at 815pm. Independent ADLs. LS clear. CMS intact. Due to void. BM this AM. Tele NSR. Low Na/fat diet for now. Need to be NPO after midnight if troponin trending up. Awaiting echocardiogram and angiogram. Discharge pending. Afebrile. VSS on RA, except hypertension.     Troponin 4.274. Abnormal EKG. Will start on Nitro drip. Patient updated re CCU transfer.   "

## 2018-02-11 NOTE — PLAN OF CARE
Problem: Patient Care Overview  Goal: Plan of Care/Patient Progress Review  A&OX4, chest pain free, on nitro gtt 0.14mcg/kg/min and heparin gtt 10.5cc/hr. Next 10a due on am. Up independently  in room. Last trop down to 6.823 and no further troponin check needed per cards. Plan for angio tomorrow.

## 2018-02-11 NOTE — PHARMACY-ADMISSION MEDICATION HISTORY
Admission medication history interview status for the 2/10/2018  admission is complete. See EPIC admission navigator for prior to admission medications     Medication history source reliability:Good    Actions taken by pharmacist (provider contacted, etc):None     Additional medication history information not noted on PTA med list :None    Medication reconciliation/reorder completed by provider prior to medication history? No    Time spent in this activity: 3 min    Prior to Admission medications    Not on File

## 2018-02-11 NOTE — PLAN OF CARE
Problem: Cardiac: ACS (Acute Coronary Syndrome) (Adult)  Goal: Signs and Symptoms of Listed Potential Problems Will be Absent, Minimized or Managed (Cardiac: ACS)  Signs and symptoms of listed potential problems will be absent, minimized or managed by discharge/transition of care (reference Cardiac: ACS (Acute Coronary Syndrome) (Adult) CPG).  Outcome: Improving  Patient transferred from [facility] to  for NTG drip protocol.  NTG drip kept at starting dose due to low BP's.  Evening Metoprolol dose had been given, which also kept SBP's in the 90's.  At 0100, NTG increased to next level and patient remained pain free the rest of night.  Troponin levels trended down by morning, with high Troponin at 7.6.  Patient standing at bedside to void.  Patient's wife kept informed of Troponin levels.  Patient aware of being kept NPO for impending Angiogram and until seen by Cardiology.

## 2018-02-11 NOTE — PROGRESS NOTES
[facility]    Hospitalist Progress Note    Assessment & Plan   Lavell Mercer is a 52 year old male who was admitted on 2/10/2018, transferred from [facility] for NSTEMI.     1) NSTEMI: Remains on heparin and NTG gtt. Now chest pain free.  --Continue ASA, lipitor, lisinopril, metoprolol  --Cardiology to see for angiogram    DVT Prophylaxis: ON full anticoagulation with heparin  Code Status: Full Code    Disposition: Expected discharge in 1-2 days pending angiogram results/interventions.    Phillip Minor MD  Text Page (7am to 6pm)    Interval History   Chart reviewed, patient seen. Remains on nitro gtt and is currently CP free.      -Data reviewed today: I reviewed all new labs and imaging results over the last 24 hours. I personally reviewed the EKG tracing showing lateral St changes.    Physical Exam   Temp: 98.1  F (36.7  C) Temp src: Oral BP: (!) 118/94   Heart Rate: 51 Resp: 12 SpO2: 98 % O2 Device: None (Room air)    Vitals:    02/10/18 1750 02/11/18 0712   Weight: 113.7 kg (250 lb 9.6 oz) 112.7 kg (248 lb 8 oz)     Vital Signs with Ranges  Temp:  [98.1  F (36.7  C)-98.5  F (36.9  C)] 98.1  F (36.7  C)  Pulse:  [73] 73  Heart Rate:  [51-80] 51  Resp:  [0-39] 12  BP: ()/() 118/94  SpO2:  [92 %-99 %] 98 %  I/O last 3 completed shifts:  In: 700 [P.O.:700]  Out: 800 [Urine:800]    Constitutional: Awake, alert, cooperative, no apparent distress  Respiratory: Clear to auscultation bilaterally, no crackles or wheezing  Cardiovascular: Regular rate and rhythm, normal S1 and S2, and no murmur noted  GI: Normal bowel sounds, soft, non-distended, non-tender  Skin/Integumen: No rashes, no cyanosis, no edema  Other:      Medications     - MEDICATION INSTRUCTIONS -       - MEDICATION INSTRUCTIONS -       - MEDICATION INSTRUCTIONS -       HEParin 1,050 Units/hr (02/11/18 0000)     nitroGLYcerin 0.14 mcg/kg/min (02/11/18 0152)       sodium chloride (PF)  3 mL Intracatheter Q8H     aspirin  325 mg  Oral Daily     lisinopril  2.5 mg Oral Daily     metoprolol tartrate  25 mg Oral BID     atorvastatin  40 mg Oral Daily       Data     Recent Labs  Lab 02/11/18  0605 02/11/18  0015 02/10/18  1823 02/10/18  1325  02/10/18  1321   WBC  --   --  8.0 6.4  --   --    HGB  --   --  14.9 16.8  --   --    MCV  --   --  87 87  --   --    PLT  --   --  218 222  --   --    INR  --   --   --  1.01  --   --    NA  --   --   --  138  --   --    POTASSIUM  --   --   --  3.9  --   --    CHLORIDE  --   --   --  107  --   --    CO2  --   --   --  25  --   --    BUN  --   --   --  13  --   --    CR  --   --   --  0.72  --   --    ANIONGAP  --   --   --  6  --   --    BRAN  --   --   --  9.2  --   --    GLC  --   --   --  103*  --   --    ALBUMIN  --   --   --  4.0  --   --    PROTTOTAL  --   --   --  8.0  --   --    BILITOTAL  --   --   --  0.9  --   --    ALKPHOS  --   --   --  70  --   --    ALT  --   --   --  60  --   --    AST  --   --   --  37  --   --    LIPASE  --   --   --  141  --   --    TROPI 6.823* 7.662* 4.274* 1.708*  < >  --    TROPONIN  --   --   --   --   --  1.31*   < > = values in this interval not displayed.    Imaging:   Recent Results (from the past 24 hour(s))   XR Chest Port 1 View    Narrative    CHEST PORTABLE ONE VIEW   2/10/2018 2:14 PM     HISTORY: Chest pain.    COMPARISON: None.    FINDINGS: Heart size normal. Lungs clear.      Impression    IMPRESSION: Negative.    CHRISTOPHER SOUSA MD

## 2018-02-11 NOTE — CONSULTS
Consult Date:  02/11/2018      CARDIOLOGY CONSULT      REFERRING PHYSICIAN:  Sudha Hanson PA-C      CHIEF COMPLAINT:  NSTEMI.      HISTORY OF PRESENT ILLNESS:  Mr. Mercer is a 52-year-old man with a history of some hyperlipidemia in the past and distant smoking but otherwise relatively healthy.  He was transferred to [facility] with an NSTEMI.  He was initially evaluated at [facility].      The patient describes that he had chest pain first on Thursday that lasted for a few hours and then resolved.  He felt very fatigued and tired on Friday.  Then on Saturday he woke up with similar chest pain that eventually brought him into the ED.  His first troponin was elevated at 1.7.  This then trended up to 7.6, but has now started coming back down to 6.8.  The patient's first ECG there had some slight ST elevation in lead III.  To clarify, I was called last night with a request to transfer the patient to [facility] for an NSTEMI.  I was not asked to review the ECGs.  I was told that the patient had some slight ST elevation in lead III, but did not have any elevation in II and AVF meeting criteria.      In reviewing the ECGs today, I do agree with that assessment.  He does also have some ST elevation in lead V1, although the ST elevation in V2 and V3 is quite minimal and typical with what is seen in a younger man.  The initial ECG did interestingly show some Q-waves in the inferior leads, particularly lead III and AVf seem to have pathologic Q-waves suggesting prior inferior infarction that might be related to the event on Thursday or may even be more of a chronic event.  The patient's ECG, at that time does not meet the criteria for a STEMI, so I do agree with the initial interpretation of the ED physician.  The patient's ECG today does not show any significant ST elevation.  There is some T-wave inversion in lead III and AVf and the Q-waves are still present.      Fortunately, overnight the patient's chest pain  resolved entirely.  He said it had gone down to around 1-1/2 early last night.  The nitroglycerin drip was then turned up slightly around midnight and his pain completely resolved.  Today, he is completely pain-free.  His heart rate and blood pressure are appropriate.      REVIEW OF SYSTEMS:  Ten-point review of systems negative except as per the HPI.  The patient does mention that he had a flu-like illness earlier this year, also on Friday he felt fatigued as mentioned.      PAST MEDICAL HISTORY:     1.  Prior hyperlipidemia, on medications.   2.  Obesity.      SOCIAL HISTORY:  The patient smoked for about 10 years but quit around 20 years ago.        VITALS:  Pulse of 73, blood pressure 110/81.      PHYSICAL EXAMINATION:   GENERAL:  No apparent distress, sitting comfortably in bed.   HEART:  Regular rate and rhythm, no murmurs.   LUNGS:  Clear to auscultation.   ABDOMEN:  Soft.   EXTREMITIES:  No peripheral edema.   MENTAL:  Alert and oriented, normal conversation, very pleasant.   NEUROLOGIC:  Moving all extremities.      ASSESSMENT AND PLAN:   1.  Non-ST elevation myocardial infarction (NSTEMI), currently chest pain-free.   2.  Obesity.   3.  History of hyperlipidemia.      Mr. Mercer is currently doing very well.  He did have some slight ST changes initially but not meeting criteria for a STEMI.  He also did have Q-waves present initially suggesting that this may have been a delayed presentation.  His troponin did increase from the initial but has subsequently decreased coinciding with the resolution of the chest pain.  His troponin is trending down.  He appears to be quite stable now.  We will plan for a coronary angiogram tomorrow.  I discussed the procedure in detail.  I discussed the risk of the procedure with the patient, including but not limited to death, stroke, heart failure, heart attack, vascular injury, cardiac injury, bleeding, hematoma, kidney injury, and risks of contrast and radiation.  He  would like to move forward.      He will continue on heparin and aspirin, will also continue the metoprolol and lisinopril for now.  He will continue on Lipitor.      Thank you for the consult.  We will continue to follow.         OSCAR CHRISTIANSON MD             D: 2018   T: 2018   MT: NEY      Name:     LOUIE VEGAS   MRN:      [id number]        Account:       [id number]   :      1965           Consult Date:  2018      Document: [id number]

## 2018-02-11 NOTE — PROGRESS NOTES
Discussed case with Dr. Almanzar.  Non-Q-wave myocardial infarct with a peak troponin of approximately 7.  Patient is pain-free.  Coronary angiogram tomorrow unless recurrent angina.

## 2018-02-12 ENCOUNTER — APPOINTMENT (OUTPATIENT)
Dept: CARDIOLOGY | Facility: CLINIC | Age: 53
DRG: 247 | End: 2018-02-12
Attending: INTERNAL MEDICINE
Payer: COMMERCIAL

## 2018-02-12 LAB
ANION GAP SERPL CALCULATED.3IONS-SCNC: 6 MMOL/L (ref 3–14)
BUN SERPL-MCNC: 12 MG/DL (ref 7–30)
CALCIUM SERPL-MCNC: 8.5 MG/DL (ref 8.5–10.1)
CHLORIDE SERPL-SCNC: 110 MMOL/L (ref 94–109)
CO2 SERPL-SCNC: 25 MMOL/L (ref 20–32)
CREAT SERPL-MCNC: 0.8 MG/DL (ref 0.66–1.25)
ERYTHROCYTE [DISTWIDTH] IN BLOOD BY AUTOMATED COUNT: 13.1 % (ref 10–15)
GFR SERPL CREATININE-BSD FRML MDRD: >90 ML/MIN/1.7M2
GLUCOSE SERPL-MCNC: 102 MG/DL (ref 70–99)
HCT VFR BLD AUTO: 43 % (ref 40–53)
HGB BLD-MCNC: 15.1 G/DL (ref 13.3–17.7)
LMWH PPP CHRO-ACNC: 0.22 IU/ML
MCH RBC QN AUTO: 31.2 PG (ref 26.5–33)
MCHC RBC AUTO-ENTMCNC: 35.1 G/DL (ref 31.5–36.5)
MCV RBC AUTO: 89 FL (ref 78–100)
PLATELET # BLD AUTO: 179 10E9/L (ref 150–450)
POTASSIUM SERPL-SCNC: 4 MMOL/L (ref 3.4–5.3)
RBC # BLD AUTO: 4.84 10E12/L (ref 4.4–5.9)
SODIUM SERPL-SCNC: 141 MMOL/L (ref 133–144)
WBC # BLD AUTO: 6.3 10E9/L (ref 4–11)

## 2018-02-12 PROCEDURE — 99233 SBSQ HOSP IP/OBS HIGH 50: CPT | Mod: 25 | Performed by: INTERNAL MEDICINE

## 2018-02-12 PROCEDURE — 27210914 ZZH SHEATH CR8

## 2018-02-12 PROCEDURE — 27210759 ZZH DEVICE INFLATION CR6

## 2018-02-12 PROCEDURE — C1874 STENT, COATED/COV W/DEL SYS: HCPCS

## 2018-02-12 PROCEDURE — 27211089 ZZH KIT ACIST INJECTOR CR3

## 2018-02-12 PROCEDURE — 25000128 H RX IP 250 OP 636: Performed by: INTERNAL MEDICINE

## 2018-02-12 PROCEDURE — C9606 PERC D-E COR REVASC W AMI S: HCPCS | Mod: LC

## 2018-02-12 PROCEDURE — 27210946 ZZH KIT HC TOTES DISP CR8

## 2018-02-12 PROCEDURE — 99152 MOD SED SAME PHYS/QHP 5/>YRS: CPT | Mod: GC | Performed by: INTERNAL MEDICINE

## 2018-02-12 PROCEDURE — 27210892 ZZH CATH CR4

## 2018-02-12 PROCEDURE — C1769 GUIDE WIRE: HCPCS

## 2018-02-12 PROCEDURE — 027034Z DILATION OF CORONARY ARTERY, ONE ARTERY WITH DRUG-ELUTING INTRALUMINAL DEVICE, PERCUTANEOUS APPROACH: ICD-10-PCS | Performed by: INTERNAL MEDICINE

## 2018-02-12 PROCEDURE — 4A023N7 MEASUREMENT OF CARDIAC SAMPLING AND PRESSURE, LEFT HEART, PERCUTANEOUS APPROACH: ICD-10-PCS | Performed by: INTERNAL MEDICINE

## 2018-02-12 PROCEDURE — 93458 L HRT ARTERY/VENTRICLE ANGIO: CPT

## 2018-02-12 PROCEDURE — C9600 PERC DRUG-EL COR STENT SING: HCPCS

## 2018-02-12 PROCEDURE — 25000125 ZZHC RX 250: Performed by: INTERNAL MEDICINE

## 2018-02-12 PROCEDURE — 92943 PRQ TRLUML REVSC CH OCC ANT: CPT | Mod: LC | Performed by: INTERNAL MEDICINE

## 2018-02-12 PROCEDURE — 25000132 ZZH RX MED GY IP 250 OP 250 PS 637: Performed by: PHYSICIAN ASSISTANT

## 2018-02-12 PROCEDURE — 93005 ELECTROCARDIOGRAM TRACING: CPT

## 2018-02-12 PROCEDURE — 80048 BASIC METABOLIC PNL TOTAL CA: CPT | Performed by: PHYSICIAN ASSISTANT

## 2018-02-12 PROCEDURE — 25000128 H RX IP 250 OP 636

## 2018-02-12 PROCEDURE — 93010 ELECTROCARDIOGRAM REPORT: CPT | Mod: 76 | Performed by: INTERNAL MEDICINE

## 2018-02-12 PROCEDURE — 85347 COAGULATION TIME ACTIVATED: CPT

## 2018-02-12 PROCEDURE — 99232 SBSQ HOSP IP/OBS MODERATE 35: CPT | Performed by: HOSPITALIST

## 2018-02-12 PROCEDURE — 93458 L HRT ARTERY/VENTRICLE ANGIO: CPT | Mod: 26 | Performed by: INTERNAL MEDICINE

## 2018-02-12 PROCEDURE — 85520 HEPARIN ASSAY: CPT | Performed by: PHYSICIAN ASSISTANT

## 2018-02-12 PROCEDURE — C1725 CATH, TRANSLUMIN NON-LASER: HCPCS

## 2018-02-12 PROCEDURE — 99152 MOD SED SAME PHYS/QHP 5/>YRS: CPT

## 2018-02-12 PROCEDURE — 21000000 ZZH R&B IMCU HEART CARE

## 2018-02-12 PROCEDURE — C1887 CATHETER, GUIDING: HCPCS

## 2018-02-12 PROCEDURE — 85027 COMPLETE CBC AUTOMATED: CPT | Performed by: PHYSICIAN ASSISTANT

## 2018-02-12 PROCEDURE — 27210856 ZZH ACCESS HEART CATH CR2

## 2018-02-12 PROCEDURE — B2111ZZ FLUOROSCOPY OF MULTIPLE CORONARY ARTERIES USING LOW OSMOLAR CONTRAST: ICD-10-PCS | Performed by: INTERNAL MEDICINE

## 2018-02-12 PROCEDURE — B2151ZZ FLUOROSCOPY OF LEFT HEART USING LOW OSMOLAR CONTRAST: ICD-10-PCS | Performed by: INTERNAL MEDICINE

## 2018-02-12 PROCEDURE — 25000132 ZZH RX MED GY IP 250 OP 250 PS 637: Performed by: INTERNAL MEDICINE

## 2018-02-12 PROCEDURE — 36415 COLL VENOUS BLD VENIPUNCTURE: CPT | Performed by: PHYSICIAN ASSISTANT

## 2018-02-12 PROCEDURE — 27210795 ZZH PAD DEFIB QUICK CR4

## 2018-02-12 PROCEDURE — 27210787 ZZH MANIFOLD CR2

## 2018-02-12 PROCEDURE — B240ZZ3 ULTRASONOGRAPHY OF SINGLE CORONARY ARTERY, INTRAVASCULAR: ICD-10-PCS | Performed by: INTERNAL MEDICINE

## 2018-02-12 PROCEDURE — 93010 ELECTROCARDIOGRAM REPORT: CPT | Mod: 77 | Performed by: INTERNAL MEDICINE

## 2018-02-12 PROCEDURE — 99153 MOD SED SAME PHYS/QHP EA: CPT

## 2018-02-12 RX ORDER — ASPIRIN 81 MG/1
81-324 TABLET, CHEWABLE ORAL
Status: DISCONTINUED | OUTPATIENT
Start: 2018-02-12 | End: 2018-02-12 | Stop reason: HOSPADM

## 2018-02-12 RX ORDER — LIDOCAINE 40 MG/G
CREAM TOPICAL
Status: DISCONTINUED | OUTPATIENT
Start: 2018-02-12 | End: 2018-02-12 | Stop reason: HOSPADM

## 2018-02-12 RX ORDER — NITROGLYCERIN 0.4 MG/1
0.4 TABLET SUBLINGUAL EVERY 5 MIN PRN
Status: DISCONTINUED | OUTPATIENT
Start: 2018-02-12 | End: 2018-02-13 | Stop reason: HOSPADM

## 2018-02-12 RX ORDER — ASPIRIN 81 MG/1
81 TABLET ORAL DAILY
Status: DISCONTINUED | OUTPATIENT
Start: 2018-02-13 | End: 2018-02-13 | Stop reason: HOSPADM

## 2018-02-12 RX ORDER — LIDOCAINE HYDROCHLORIDE 10 MG/ML
1-10 INJECTION, SOLUTION EPIDURAL; INFILTRATION; INTRACAUDAL; PERINEURAL
Status: COMPLETED | OUTPATIENT
Start: 2018-02-12 | End: 2018-02-12

## 2018-02-12 RX ORDER — PROMETHAZINE HYDROCHLORIDE 25 MG/ML
6.25-25 INJECTION, SOLUTION INTRAMUSCULAR; INTRAVENOUS EVERY 4 HOURS PRN
Status: DISCONTINUED | OUTPATIENT
Start: 2018-02-12 | End: 2018-02-12 | Stop reason: HOSPADM

## 2018-02-12 RX ORDER — SODIUM CHLORIDE 9 MG/ML
INJECTION, SOLUTION INTRAVENOUS CONTINUOUS
Status: DISCONTINUED | OUTPATIENT
Start: 2018-02-12 | End: 2018-02-12 | Stop reason: HOSPADM

## 2018-02-12 RX ORDER — HYDROCODONE BITARTRATE AND ACETAMINOPHEN 5; 325 MG/1; MG/1
1-2 TABLET ORAL EVERY 4 HOURS PRN
Status: DISCONTINUED | OUTPATIENT
Start: 2018-02-12 | End: 2018-02-13 | Stop reason: HOSPADM

## 2018-02-12 RX ORDER — POTASSIUM CHLORIDE 29.8 MG/ML
20 INJECTION INTRAVENOUS
Status: DISCONTINUED | OUTPATIENT
Start: 2018-02-12 | End: 2018-02-12 | Stop reason: HOSPADM

## 2018-02-12 RX ORDER — MORPHINE SULFATE 2 MG/ML
1-2 INJECTION, SOLUTION INTRAMUSCULAR; INTRAVENOUS EVERY 5 MIN PRN
Status: DISCONTINUED | OUTPATIENT
Start: 2018-02-12 | End: 2018-02-12 | Stop reason: HOSPADM

## 2018-02-12 RX ORDER — FLUMAZENIL 0.1 MG/ML
0.2 INJECTION, SOLUTION INTRAVENOUS
Status: ACTIVE | OUTPATIENT
Start: 2018-02-12 | End: 2018-02-13

## 2018-02-12 RX ORDER — FUROSEMIDE 10 MG/ML
20-100 INJECTION INTRAMUSCULAR; INTRAVENOUS
Status: DISCONTINUED | OUTPATIENT
Start: 2018-02-12 | End: 2018-02-12 | Stop reason: HOSPADM

## 2018-02-12 RX ORDER — PROTAMINE SULFATE 10 MG/ML
1-5 INJECTION, SOLUTION INTRAVENOUS
Status: DISCONTINUED | OUTPATIENT
Start: 2018-02-12 | End: 2018-02-12 | Stop reason: HOSPADM

## 2018-02-12 RX ORDER — NALOXONE HYDROCHLORIDE 0.4 MG/ML
.1-.4 INJECTION, SOLUTION INTRAMUSCULAR; INTRAVENOUS; SUBCUTANEOUS
Status: DISCONTINUED | OUTPATIENT
Start: 2018-02-12 | End: 2018-02-12

## 2018-02-12 RX ORDER — ENALAPRILAT 1.25 MG/ML
1.25-2.5 INJECTION INTRAVENOUS
Status: DISCONTINUED | OUTPATIENT
Start: 2018-02-12 | End: 2018-02-12 | Stop reason: HOSPADM

## 2018-02-12 RX ORDER — ATROPINE SULFATE 0.1 MG/ML
.5-1 INJECTION INTRAVENOUS
Status: DISCONTINUED | OUTPATIENT
Start: 2018-02-12 | End: 2018-02-12 | Stop reason: HOSPADM

## 2018-02-12 RX ORDER — EPINEPHRINE 1 MG/ML
0.3 INJECTION, SOLUTION, CONCENTRATE INTRAVENOUS
Status: DISCONTINUED | OUTPATIENT
Start: 2018-02-12 | End: 2018-02-12 | Stop reason: HOSPADM

## 2018-02-12 RX ORDER — LORAZEPAM 0.5 MG/1
0.5 TABLET ORAL
Status: DISCONTINUED | OUTPATIENT
Start: 2018-02-12 | End: 2018-02-12 | Stop reason: HOSPADM

## 2018-02-12 RX ORDER — DEXTROSE MONOHYDRATE 25 G/50ML
12.5-5 INJECTION, SOLUTION INTRAVENOUS EVERY 30 MIN PRN
Status: DISCONTINUED | OUTPATIENT
Start: 2018-02-12 | End: 2018-02-12 | Stop reason: HOSPADM

## 2018-02-12 RX ORDER — NITROGLYCERIN 20 MG/100ML
.07-1.77 INJECTION INTRAVENOUS CONTINUOUS PRN
Status: DISCONTINUED | OUTPATIENT
Start: 2018-02-12 | End: 2018-02-12 | Stop reason: HOSPADM

## 2018-02-12 RX ORDER — DIPHENHYDRAMINE HYDROCHLORIDE 50 MG/ML
25-50 INJECTION INTRAMUSCULAR; INTRAVENOUS
Status: DISCONTINUED | OUTPATIENT
Start: 2018-02-12 | End: 2018-02-12 | Stop reason: HOSPADM

## 2018-02-12 RX ORDER — POTASSIUM CHLORIDE 1500 MG/1
20 TABLET, EXTENDED RELEASE ORAL
Status: DISCONTINUED | OUTPATIENT
Start: 2018-02-12 | End: 2018-02-12 | Stop reason: HOSPADM

## 2018-02-12 RX ORDER — CLOPIDOGREL 300 MG/1
300-600 TABLET, FILM COATED ORAL
Status: COMPLETED | OUTPATIENT
Start: 2018-02-12 | End: 2018-02-12

## 2018-02-12 RX ORDER — METHYLPREDNISOLONE SODIUM SUCCINATE 125 MG/2ML
125 INJECTION, POWDER, LYOPHILIZED, FOR SOLUTION INTRAMUSCULAR; INTRAVENOUS
Status: DISCONTINUED | OUTPATIENT
Start: 2018-02-12 | End: 2018-02-12 | Stop reason: HOSPADM

## 2018-02-12 RX ORDER — FENTANYL CITRATE 50 UG/ML
25-50 INJECTION, SOLUTION INTRAMUSCULAR; INTRAVENOUS
Status: ACTIVE | OUTPATIENT
Start: 2018-02-12 | End: 2018-02-13

## 2018-02-12 RX ORDER — ASPIRIN 325 MG
325 TABLET ORAL
Status: DISCONTINUED | OUTPATIENT
Start: 2018-02-12 | End: 2018-02-12 | Stop reason: HOSPADM

## 2018-02-12 RX ORDER — LORAZEPAM 2 MG/ML
0.5 INJECTION INTRAMUSCULAR
Status: DISCONTINUED | OUTPATIENT
Start: 2018-02-12 | End: 2018-02-12 | Stop reason: HOSPADM

## 2018-02-12 RX ORDER — NITROGLYCERIN 0.4 MG/1
0.4 TABLET SUBLINGUAL EVERY 5 MIN PRN
Status: DISCONTINUED | OUTPATIENT
Start: 2018-02-12 | End: 2018-02-12 | Stop reason: HOSPADM

## 2018-02-12 RX ORDER — POTASSIUM CHLORIDE 7.45 MG/ML
10 INJECTION INTRAVENOUS
Status: DISCONTINUED | OUTPATIENT
Start: 2018-02-12 | End: 2018-02-12 | Stop reason: HOSPADM

## 2018-02-12 RX ORDER — FENTANYL CITRATE 50 UG/ML
25-50 INJECTION, SOLUTION INTRAMUSCULAR; INTRAVENOUS
Status: DISCONTINUED | OUTPATIENT
Start: 2018-02-12 | End: 2018-02-12 | Stop reason: HOSPADM

## 2018-02-12 RX ORDER — NITROGLYCERIN 5 MG/ML
100-200 VIAL (ML) INTRAVENOUS
Status: DISCONTINUED | OUTPATIENT
Start: 2018-02-12 | End: 2018-02-12 | Stop reason: HOSPADM

## 2018-02-12 RX ORDER — PRASUGREL 10 MG/1
10-60 TABLET, FILM COATED ORAL
Status: DISCONTINUED | OUTPATIENT
Start: 2018-02-12 | End: 2018-02-12 | Stop reason: HOSPADM

## 2018-02-12 RX ORDER — VERAPAMIL HYDROCHLORIDE 2.5 MG/ML
1-2.5 INJECTION, SOLUTION INTRAVENOUS
Status: COMPLETED | OUTPATIENT
Start: 2018-02-12 | End: 2018-02-12

## 2018-02-12 RX ORDER — PROTAMINE SULFATE 10 MG/ML
25-100 INJECTION, SOLUTION INTRAVENOUS EVERY 5 MIN PRN
Status: DISCONTINUED | OUTPATIENT
Start: 2018-02-12 | End: 2018-02-12 | Stop reason: HOSPADM

## 2018-02-12 RX ORDER — CLOPIDOGREL BISULFATE 75 MG/1
75 TABLET ORAL
Status: DISCONTINUED | OUTPATIENT
Start: 2018-02-12 | End: 2018-02-12 | Stop reason: HOSPADM

## 2018-02-12 RX ORDER — ONDANSETRON 2 MG/ML
4 INJECTION INTRAMUSCULAR; INTRAVENOUS EVERY 4 HOURS PRN
Status: DISCONTINUED | OUTPATIENT
Start: 2018-02-12 | End: 2018-02-12 | Stop reason: HOSPADM

## 2018-02-12 RX ORDER — LIDOCAINE HYDROCHLORIDE 10 MG/ML
30 INJECTION, SOLUTION EPIDURAL; INFILTRATION; INTRACAUDAL; PERINEURAL
Status: DISCONTINUED | OUTPATIENT
Start: 2018-02-12 | End: 2018-02-12 | Stop reason: HOSPADM

## 2018-02-12 RX ORDER — NITROGLYCERIN 5 MG/ML
100-500 VIAL (ML) INTRAVENOUS
Status: COMPLETED | OUTPATIENT
Start: 2018-02-12 | End: 2018-02-12

## 2018-02-12 RX ORDER — DOBUTAMINE HYDROCHLORIDE 200 MG/100ML
2-20 INJECTION INTRAVENOUS CONTINUOUS PRN
Status: DISCONTINUED | OUTPATIENT
Start: 2018-02-12 | End: 2018-02-12 | Stop reason: HOSPADM

## 2018-02-12 RX ORDER — PHENYLEPHRINE HCL IN 0.9% NACL 1 MG/10 ML
20-100 SYRINGE (ML) INTRAVENOUS
Status: DISCONTINUED | OUTPATIENT
Start: 2018-02-12 | End: 2018-02-12 | Stop reason: HOSPADM

## 2018-02-12 RX ORDER — NALOXONE HYDROCHLORIDE 0.4 MG/ML
.2-.4 INJECTION, SOLUTION INTRAMUSCULAR; INTRAVENOUS; SUBCUTANEOUS
Status: DISCONTINUED | OUTPATIENT
Start: 2018-02-12 | End: 2018-02-12

## 2018-02-12 RX ORDER — LIDOCAINE 40 MG/G
CREAM TOPICAL
Status: DISCONTINUED | OUTPATIENT
Start: 2018-02-12 | End: 2018-02-12

## 2018-02-12 RX ORDER — ACETAMINOPHEN 325 MG/1
325-650 TABLET ORAL EVERY 4 HOURS PRN
Status: DISCONTINUED | OUTPATIENT
Start: 2018-02-12 | End: 2018-02-13 | Stop reason: HOSPADM

## 2018-02-12 RX ORDER — SODIUM NITROPRUSSIDE 25 MG/ML
100-200 INJECTION INTRAVENOUS
Status: DISCONTINUED | OUTPATIENT
Start: 2018-02-12 | End: 2018-02-12 | Stop reason: HOSPADM

## 2018-02-12 RX ORDER — LORAZEPAM 2 MG/ML
.5-2 INJECTION INTRAMUSCULAR EVERY 4 HOURS PRN
Status: DISCONTINUED | OUTPATIENT
Start: 2018-02-12 | End: 2018-02-12 | Stop reason: HOSPADM

## 2018-02-12 RX ORDER — IOPAMIDOL 755 MG/ML
305 INJECTION, SOLUTION INTRAVASCULAR ONCE
Status: COMPLETED | OUTPATIENT
Start: 2018-02-12 | End: 2018-02-12

## 2018-02-12 RX ORDER — ATROPINE SULFATE 0.1 MG/ML
0.5 INJECTION INTRAVENOUS EVERY 5 MIN PRN
Status: ACTIVE | OUTPATIENT
Start: 2018-02-12 | End: 2018-02-13

## 2018-02-12 RX ORDER — BUPIVACAINE HYDROCHLORIDE 2.5 MG/ML
1-10 INJECTION, SOLUTION EPIDURAL; INFILTRATION; INTRACAUDAL
Status: DISCONTINUED | OUTPATIENT
Start: 2018-02-12 | End: 2018-02-12 | Stop reason: HOSPADM

## 2018-02-12 RX ORDER — DOPAMINE HYDROCHLORIDE 160 MG/100ML
2-20 INJECTION, SOLUTION INTRAVENOUS CONTINUOUS PRN
Status: DISCONTINUED | OUTPATIENT
Start: 2018-02-12 | End: 2018-02-12 | Stop reason: HOSPADM

## 2018-02-12 RX ORDER — HEPARIN SODIUM 1000 [USP'U]/ML
1000-10000 INJECTION, SOLUTION INTRAVENOUS; SUBCUTANEOUS EVERY 5 MIN PRN
Status: DISCONTINUED | OUTPATIENT
Start: 2018-02-12 | End: 2018-02-12 | Stop reason: HOSPADM

## 2018-02-12 RX ORDER — FLUMAZENIL 0.1 MG/ML
0.2 INJECTION, SOLUTION INTRAVENOUS
Status: DISCONTINUED | OUTPATIENT
Start: 2018-02-12 | End: 2018-02-12 | Stop reason: HOSPADM

## 2018-02-12 RX ORDER — SODIUM CHLORIDE 9 MG/ML
INJECTION, SOLUTION INTRAVENOUS CONTINUOUS
Status: ACTIVE | OUTPATIENT
Start: 2018-02-12 | End: 2018-02-12

## 2018-02-12 RX ORDER — NICARDIPINE HYDROCHLORIDE 2.5 MG/ML
100 INJECTION INTRAVENOUS
Status: DISCONTINUED | OUTPATIENT
Start: 2018-02-12 | End: 2018-02-12 | Stop reason: HOSPADM

## 2018-02-12 RX ORDER — NALOXONE HYDROCHLORIDE 0.4 MG/ML
0.4 INJECTION, SOLUTION INTRAMUSCULAR; INTRAVENOUS; SUBCUTANEOUS EVERY 5 MIN PRN
Status: DISCONTINUED | OUTPATIENT
Start: 2018-02-12 | End: 2018-02-12 | Stop reason: HOSPADM

## 2018-02-12 RX ORDER — METOPROLOL TARTRATE 1 MG/ML
5 INJECTION, SOLUTION INTRAVENOUS EVERY 5 MIN PRN
Status: DISCONTINUED | OUTPATIENT
Start: 2018-02-12 | End: 2018-02-12 | Stop reason: HOSPADM

## 2018-02-12 RX ORDER — HYDRALAZINE HYDROCHLORIDE 20 MG/ML
10-20 INJECTION INTRAMUSCULAR; INTRAVENOUS
Status: DISCONTINUED | OUTPATIENT
Start: 2018-02-12 | End: 2018-02-12 | Stop reason: HOSPADM

## 2018-02-12 RX ORDER — NIFEDIPINE 10 MG/1
10 CAPSULE ORAL
Status: DISCONTINUED | OUTPATIENT
Start: 2018-02-12 | End: 2018-02-12 | Stop reason: HOSPADM

## 2018-02-12 RX ORDER — ADENOSINE 3 MG/ML
12-12000 INJECTION, SOLUTION INTRAVENOUS
Status: DISCONTINUED | OUTPATIENT
Start: 2018-02-12 | End: 2018-02-12 | Stop reason: HOSPADM

## 2018-02-12 RX ORDER — CLOPIDOGREL BISULFATE 75 MG/1
75 TABLET ORAL DAILY
Status: DISCONTINUED | OUTPATIENT
Start: 2018-02-13 | End: 2018-02-13 | Stop reason: HOSPADM

## 2018-02-12 RX ADMIN — SODIUM CHLORIDE: 9 INJECTION, SOLUTION INTRAVENOUS at 06:47

## 2018-02-12 RX ADMIN — Medication 5000 UNITS: at 13:39

## 2018-02-12 RX ADMIN — MIDAZOLAM 1 MG: 1 INJECTION INTRAMUSCULAR; INTRAVENOUS at 13:34

## 2018-02-12 RX ADMIN — METOPROLOL TARTRATE 25 MG: 25 TABLET ORAL at 08:32

## 2018-02-12 RX ADMIN — Medication 3000 UNITS: at 14:02

## 2018-02-12 RX ADMIN — LIDOCAINE HYDROCHLORIDE 10 MG: 10 INJECTION, SOLUTION EPIDURAL; INFILTRATION; INTRACAUDAL; PERINEURAL at 12:56

## 2018-02-12 RX ADMIN — ACETAMINOPHEN 650 MG: 325 TABLET, FILM COATED ORAL at 06:52

## 2018-02-12 RX ADMIN — LIDOCAINE HYDROCHLORIDE 20 MG: 10 INJECTION, SOLUTION EPIDURAL; INFILTRATION; INTRACAUDAL; PERINEURAL at 12:58

## 2018-02-12 RX ADMIN — NITROGLYCERIN 200 MCG: 5 INJECTION, SOLUTION INTRAVENOUS at 13:04

## 2018-02-12 RX ADMIN — MIDAZOLAM 1 MG: 1 INJECTION INTRAMUSCULAR; INTRAVENOUS at 12:47

## 2018-02-12 RX ADMIN — ASPIRIN 325 MG ORAL TABLET 325 MG: 325 PILL ORAL at 08:32

## 2018-02-12 RX ADMIN — HEPARIN SODIUM 1050 UNITS/HR: 10000 INJECTION, SOLUTION INTRAVENOUS at 03:41

## 2018-02-12 RX ADMIN — MIDAZOLAM 1 MG: 1 INJECTION INTRAMUSCULAR; INTRAVENOUS at 12:57

## 2018-02-12 RX ADMIN — VERAPAMIL HYDROCHLORIDE 2.5 MG: 2.5 INJECTION, SOLUTION INTRAVENOUS at 13:02

## 2018-02-12 RX ADMIN — CLOPIDOGREL BISULFATE 600 MG: 300 TABLET, FILM COATED ORAL at 14:14

## 2018-02-12 RX ADMIN — MIDAZOLAM 1 MG: 1 INJECTION INTRAMUSCULAR; INTRAVENOUS at 13:04

## 2018-02-12 RX ADMIN — Medication 2000 UNITS: at 14:20

## 2018-02-12 RX ADMIN — Medication 5000 UNITS: at 13:19

## 2018-02-12 RX ADMIN — IOPAMIDOL 305 ML: 755 INJECTION, SOLUTION INTRAVASCULAR at 15:45

## 2018-02-12 RX ADMIN — ATORVASTATIN CALCIUM 40 MG: 40 TABLET, FILM COATED ORAL at 21:39

## 2018-02-12 RX ADMIN — FENTANYL CITRATE 50 MCG: 50 INJECTION, SOLUTION INTRAMUSCULAR; INTRAVENOUS at 12:56

## 2018-02-12 RX ADMIN — LISINOPRIL 2.5 MG: 2.5 TABLET ORAL at 08:32

## 2018-02-12 ASSESSMENT — PAIN DESCRIPTION - DESCRIPTORS
DESCRIPTORS: ACHING
DESCRIPTORS: ACHING;DISCOMFORT
DESCRIPTORS: ACHING

## 2018-02-12 NOTE — PLAN OF CARE
Problem: Patient Care Overview  Goal: Plan of Care/Patient Progress Review  Outcome: Improving   A&OX4, chest pain free, on nitro gtt 0.14mcg/kg/min and heparin gtt 10.5cc/hr. Next 10a due on am. Up independently  in room. Last trop down to 6.823 and no further troponin check needed per cards. Tylenol given for headache x2. SR/SB (low 50's while sleeping). Plan for angio today. Consent signed, videos watched, pre-procedure fluids started.

## 2018-02-12 NOTE — PLAN OF CARE
Problem: Patient Care Overview  Goal: Plan of Care/Patient Progress Review  OT/CR: pt having angio today, will hold rehab today.

## 2018-02-12 NOTE — PLAN OF CARE
Problem: Cardiac Catheterization (Diagnostic/Interventional) (Adult)  Goal: Signs and Symptoms of Listed Potential Problems Will be Absent, Minimized or Managed (Cardiac Catheterization)  Signs and symptoms of listed potential problems will be absent, minimized or managed by discharge/transition of care (reference Cardiac Catheterization (Diagnostic/Interventional) (Adult) CPG).   Outcome: No Change  Post angiogram - P/S of first OM - noted also chronic occlusion of RCA   TR band on right wrist intact with 15 cc/hr.   Was given Plavix 600 mg and 13,000Units of Heparin in cath lab.  VSS and Cardiac painfree on return back  Family at bedside.

## 2018-02-12 NOTE — PROGRESS NOTES
"[facility]  Hospitalist Progress Note        Bunny Aguiar,   2018        Interval History:      Patient eagerly anticipating angiogram today.          Assessment and Plan:        52 year old male who was admitted on 2/10/2018, transferred from [facility] for NSTEMI.      NSTEMI: Remains on heparin and NTG gtt. Now chest pain free.  - Continue ASA, lipitor, lisinopril, metoprolol  - Cardiology for angiogram     DVT Prophylaxis: heparin gtt    Code: Full Code     Disposition: Expected discharge in 1-2 days pending angiogram.    Pain: # Pain Assessment:   Current Pain Score 2018   Patient currently in pain? denies yes sleeping: patient not able to self report   Pain score (0-10) - 3 -   Pain location - Head -   Pain descriptors - Aching -   - Lavell is experiencing pain due to angina. Pain management was discussed and the plan was created in a collaborative fashion.  Lavell's response to the current recommendations: mixed response  - Please see the plan for pain management as documented above                   Physical Exam:      Heart Rate: 56    Blood pressure 100/65, temperature 98.8  F (37.1  C), temperature source Oral, resp. rate 16, height 1.753 m (5' 9\"), weight 112.8 kg (248 lb 10.9 oz), SpO2 98 %.    Vitals:    02/10/18 1750 18 0712 18 0500   Weight: 113.7 kg (250 lb 9.6 oz) 112.7 kg (248 lb 8 oz) 112.8 kg (248 lb 10.9 oz)       Vital Sign Ranges  Temperature Temp  Av.5  F (36.9  C)  Min: 98  F (36.7  C)  Max: 98.8  F (37.1  C)   Blood pressure Systolic (24hrs), Av , Min:89 , Max:132        Diastolic (24hrs), Av, Min:64, Max:125      Pulse No Data Recorded   Respirations Resp  Av.5  Min: 9  Max: 30   Pulse oximetry SpO2  Av.4 %  Min: 94 %  Max: 98 %     Vital Signs with Ranges  Temp:  [98  F (36.7  C)-98.8  F (37.1  C)] 98.8  F (37.1  C)  Heart Rate:  [44-67] 56  Resp:  [9-30] 16  BP: ()/() 100/65  SpO2:  [94 " %-98 %] 98 %    I/O Last 3 Shifts:   I/O last 3 completed shifts:  In: 290 [P.O.:290]  Out: 1900 [Urine:1900]    I/O past 24 hours:     Intake/Output Summary (Last 24 hours) at 02/12/18 0818  Last data filed at 02/12/18 0647   Gross per 24 hour   Intake              290 ml   Output             1600 ml   Net            -1310 ml     GENERAL: Alert and oriented. NAD. Conversational, appropriate.   HEENT: Normocephalic. EOMI. No icterus or injection. Nares normal.   LUNGS: Clear to auscultation. No dyspnea at rest.   HEART: Regular rate. Extremities perfused.   ABDOMEN: Soft, nontender, and nondistended. Positive bowel sounds.   EXTREMITIES: No LE edema noted.   NEUROLOGIC: Moves extremities x4. No acute focal neurologic abnormalities noted.             Prior to Admission Medications:        No prescriptions prior to admission.            Medications:        Current Facility-Administered Medications   Medication Last Rate     NaCl 150 mL/hr (02/12/18 0803)     ASPIRIN NOT PRESCRIBED       - MEDICATION INSTRUCTIONS -       - MEDICATION INSTRUCTIONS -       - MEDICATION INSTRUCTIONS -       HEParin 1,050 Units/hr (02/12/18 0804)     nitroGLYcerin 0.14 mcg/kg/min (02/12/18 0804)     Current Facility-Administered Medications   Medication Dose Route Frequency     sodium chloride (PF)  3 mL Intracatheter Q8H     sodium chloride (PF)  10 mL Intravenous Once     sodium chloride (PF)  3 mL Intracatheter Q8H     aspirin  325 mg Oral Daily     lisinopril  2.5 mg Oral Daily     metoprolol tartrate  25 mg Oral BID     atorvastatin  40 mg Oral Daily     Current Facility-Administered Medications   Medication Dose Route Frequency     lidocaine (buffered or not buffered)  1 mL Other Q1H PRN     lidocaine 4%   Topical Q1H PRN     sodium chloride (PF)  3 mL Intracatheter Q1H PRN     potassium chloride  20 mEq Oral Once PRN     LORazepam  0.5 mg Intravenous Q2H PRN    Or     LORazepam  0.5 mg Oral Q2H PRN     HOLD MEDICATION   Does not  apply HOLD     ASPIRIN NOT PRESCRIBED   Other Continuous PRN     lidocaine (buffered or not buffered)  1 mL Other Q1H PRN     lidocaine 4%   Topical Q1H PRN     sodium chloride (PF)  3 mL Intracatheter Q1H PRN     - MEDICATION INSTRUCTIONS -   Does not apply Continuous PRN     alum & mag hydroxide-simethicone  30 mL Oral Q4H PRN     acetaminophen  650 mg Oral Q4H PRN     acetaminophen  650 mg Rectal Q4H PRN     naloxone  0.1-0.4 mg Intravenous Q2 Min PRN     - MEDICATION INSTRUCTIONS -   Does not apply Continuous PRN     - MEDICATION INSTRUCTIONS -   Does not apply DOES NOT GO TO MAR     senna-docusate  1 tablet Oral BID PRN    Or     senna-docusate  2 tablet Oral BID PRN     magnesium hydroxide  30 mL Oral Daily PRN     morphine  1 mg Intravenous Q2H PRN     ondansetron  4 mg Oral Q6H PRN    Or     ondansetron  4 mg Intravenous Q6H PRN     prochlorperazine  10 mg Intravenous Q6H PRN    Or     prochlorperazine  10 mg Oral Q6H PRN    Or     prochlorperazine  25 mg Rectal Q12H PRN            Data:      Lab data reviewed.     Recent Labs  Lab 02/12/18  0523 02/11/18  0605 02/11/18  0015 02/10/18  1823 02/10/18  1325  02/10/18  1321   HGB 15.1  --   --  14.9 16.8  < >  --    MCV 89  --   --  87 87  --   --      --   --  218 222  --   --    INR  --   --   --   --  1.01  --   --      --   --   --  138  --   --    POTASSIUM 4.0  --   --   --  3.9  --   --    CHLORIDE 110*  --   --   --  107  --   --    CO2 25  --   --   --  25  --   --    BUN 12  --   --   --  13  --   --    CR 0.80  --   --   --  0.72  --   --    ANIONGAP 6  --   --   --  6  --   --    BRAN 8.5  --   --   --  9.2  --   --    *  --   --   --  103*  --   --    TROPI  --  6.823* 7.662* 4.274* 1.708*  < >  --    TROPONIN  --   --   --   --   --   --  1.31*   < > = values in this interval not displayed.        Imaging:      Imaging data reviewed.     Dr. Bunny Aguiar D.O.  [facility]  Pager [phone]

## 2018-02-12 NOTE — PROGRESS NOTES
"Cardiology Progress Note          Assessment and Plan:         1) NSTEMI    PLAN  - on appropriate medical therapy  - cor angio today        Interval History:     Had episode of SS chest discomfort at rest this AM, lasted about 20 minutes. Currently CP free.              Review of Systems:   As per subjective, otherwise 5 systems reviewed and negative.           Physical Exam:   Blood pressure 100/65, temperature 98.8  F (37.1  C), temperature source Oral, resp. rate 14, height 1.753 m (5' 9\"), weight 112.8 kg (248 lb 10.9 oz), SpO2 99 %.      Vital Sign Ranges  Temperature Temp  Av.5  F (36.9  C)  Min: 98  F (36.7  C)  Max: 98.8  F (37.1  C)   Blood pressure Systolic (24hrs), Av , Min:89 , Max:132        Diastolic (24hrs), Av, Min:64, Max:125      Pulse No Data Recorded   Respirations Resp  Av.4  Min: 9  Max: 30   Pulse oximetry SpO2  Av %  Min: 94 %  Max: 99 %         Intake/Output Summary (Last 24 hours) at 18 0950  Last data filed at 18 0647   Gross per 24 hour   Intake              290 ml   Output             1300 ml   Net            -1010 ml       Constitutional: NAD  Skin: Warm and dry  Neck: No JVD  Lungs: CTA  Cardiovascular: RRR, no m/r/g  Abdomen: Soft, non tender.  Extremities and Back: No LE edema  Neurological: Nonfocal           Medications:     I have reviewed this patient's current medications.              Data:     Labs reviewed.     Tele: ROYCE Asencio M.D.    "

## 2018-02-13 ENCOUNTER — APPOINTMENT (OUTPATIENT)
Dept: OCCUPATIONAL THERAPY | Facility: CLINIC | Age: 53
DRG: 247 | End: 2018-02-13
Attending: INTERNAL MEDICINE
Payer: COMMERCIAL

## 2018-02-13 VITALS
WEIGHT: 246.1 LBS | SYSTOLIC BLOOD PRESSURE: 135 MMHG | RESPIRATION RATE: 16 BRPM | TEMPERATURE: 98.6 F | HEIGHT: 69 IN | OXYGEN SATURATION: 98 % | DIASTOLIC BLOOD PRESSURE: 88 MMHG | BODY MASS INDEX: 36.45 KG/M2

## 2018-02-13 LAB
ANION GAP SERPL CALCULATED.3IONS-SCNC: 7 MMOL/L (ref 3–14)
BUN SERPL-MCNC: 9 MG/DL (ref 7–30)
CALCIUM SERPL-MCNC: 8.5 MG/DL (ref 8.5–10.1)
CHLORIDE SERPL-SCNC: 107 MMOL/L (ref 94–109)
CO2 SERPL-SCNC: 25 MMOL/L (ref 20–32)
CREAT SERPL-MCNC: 0.82 MG/DL (ref 0.66–1.25)
ERYTHROCYTE [DISTWIDTH] IN BLOOD BY AUTOMATED COUNT: 13.1 % (ref 10–15)
GFR SERPL CREATININE-BSD FRML MDRD: >90 ML/MIN/1.7M2
GLUCOSE SERPL-MCNC: 97 MG/DL (ref 70–99)
HCT VFR BLD AUTO: 42.7 % (ref 40–53)
HGB BLD-MCNC: 14.9 G/DL (ref 13.3–17.7)
KCT BLD-ACNC: 178 SEC (ref 105–167)
KCT BLD-ACNC: 230 SEC (ref 105–167)
KCT BLD-ACNC: 242 SEC (ref 105–167)
MCH RBC QN AUTO: 30.8 PG (ref 26.5–33)
MCHC RBC AUTO-ENTMCNC: 34.9 G/DL (ref 31.5–36.5)
MCV RBC AUTO: 88 FL (ref 78–100)
PLATELET # BLD AUTO: 190 10E9/L (ref 150–450)
POTASSIUM SERPL-SCNC: 4 MMOL/L (ref 3.4–5.3)
RBC # BLD AUTO: 4.83 10E12/L (ref 4.4–5.9)
SODIUM SERPL-SCNC: 139 MMOL/L (ref 133–144)
WBC # BLD AUTO: 5.9 10E9/L (ref 4–11)

## 2018-02-13 PROCEDURE — 99239 HOSP IP/OBS DSCHRG MGMT >30: CPT | Performed by: HOSPITALIST

## 2018-02-13 PROCEDURE — 36415 COLL VENOUS BLD VENIPUNCTURE: CPT | Performed by: INTERNAL MEDICINE

## 2018-02-13 PROCEDURE — 25000132 ZZH RX MED GY IP 250 OP 250 PS 637: Performed by: INTERNAL MEDICINE

## 2018-02-13 PROCEDURE — 85027 COMPLETE CBC AUTOMATED: CPT | Performed by: INTERNAL MEDICINE

## 2018-02-13 PROCEDURE — 97165 OT EVAL LOW COMPLEX 30 MIN: CPT | Mod: GO | Performed by: OCCUPATIONAL THERAPIST

## 2018-02-13 PROCEDURE — 25000132 ZZH RX MED GY IP 250 OP 250 PS 637: Performed by: PHYSICIAN ASSISTANT

## 2018-02-13 PROCEDURE — 40000133 ZZH STATISTIC OT WARD VISIT: Performed by: OCCUPATIONAL THERAPIST

## 2018-02-13 PROCEDURE — 97110 THERAPEUTIC EXERCISES: CPT | Mod: GO | Performed by: OCCUPATIONAL THERAPIST

## 2018-02-13 PROCEDURE — 97535 SELF CARE MNGMENT TRAINING: CPT | Mod: GO | Performed by: OCCUPATIONAL THERAPIST

## 2018-02-13 PROCEDURE — 80048 BASIC METABOLIC PNL TOTAL CA: CPT | Performed by: INTERNAL MEDICINE

## 2018-02-13 PROCEDURE — 99232 SBSQ HOSP IP/OBS MODERATE 35: CPT | Performed by: INTERNAL MEDICINE

## 2018-02-13 RX ORDER — CLOPIDOGREL BISULFATE 75 MG/1
75 TABLET ORAL DAILY
Qty: 30 TABLET | Refills: 0 | Status: SHIPPED | OUTPATIENT
Start: 2018-02-14 | End: 2022-09-28

## 2018-02-13 RX ORDER — NITROGLYCERIN 0.4 MG/1
TABLET SUBLINGUAL
Qty: 25 TABLET | Refills: 0 | Status: SHIPPED | OUTPATIENT
Start: 2018-02-13 | End: 2022-09-28

## 2018-02-13 RX ORDER — ATORVASTATIN CALCIUM 20 MG/1
20 TABLET, FILM COATED ORAL DAILY
Qty: 30 TABLET | Refills: 0 | Status: SHIPPED | OUTPATIENT
Start: 2018-02-13 | End: 2022-09-28

## 2018-02-13 RX ORDER — METOPROLOL TARTRATE 25 MG/1
12.5 TABLET, FILM COATED ORAL 2 TIMES DAILY
Qty: 30 TABLET | Refills: 0 | Status: SHIPPED | OUTPATIENT
Start: 2018-02-13 | End: 2018-03-01

## 2018-02-13 RX ORDER — ATORVASTATIN CALCIUM 20 MG/1
20 TABLET, FILM COATED ORAL DAILY
Status: DISCONTINUED | OUTPATIENT
Start: 2018-02-13 | End: 2018-02-13 | Stop reason: HOSPADM

## 2018-02-13 RX ORDER — LISINOPRIL 2.5 MG/1
2.5 TABLET ORAL DAILY
Qty: 30 TABLET | Refills: 0 | Status: SHIPPED | OUTPATIENT
Start: 2018-02-14 | End: 2022-09-28

## 2018-02-13 RX ADMIN — LISINOPRIL 2.5 MG: 2.5 TABLET ORAL at 08:21

## 2018-02-13 RX ADMIN — CLOPIDOGREL 75 MG: 75 TABLET, FILM COATED ORAL at 08:21

## 2018-02-13 RX ADMIN — ASPIRIN 81 MG: 81 TABLET, COATED ORAL at 08:21

## 2018-02-13 ASSESSMENT — ACTIVITIES OF DAILY LIVING (ADL): PREVIOUS_RESPONSIBILITIES: WORK;DRIVING;FINANCES;MEDICATION MANAGEMENT;YARDWORK;SHOPPING;LAUNDRY;HOUSEKEEPING;MEAL PREP

## 2018-02-13 NOTE — PLAN OF CARE
Problem: Patient Care Overview  Goal: Plan of Care/Patient Progress Review  Outcome: No Change  Pt stable over night, VSS, no c/o Chest pain or sob, right wrist c/d/i with bruising post angio.  Pt slept well, no new issues.  Possible d/c today.

## 2018-02-13 NOTE — DISCHARGE SUMMARY
[facility]    Discharge Summary  Hospitalist    Date of Admission:  2/10/2018  Date of Discharge:  2/13/2018  Discharging Provider: Bunny Aguiar  Date of Service (when I saw the patient): 02/13/18    History of Present Illness   Lavell Mercer is a generally healthy 52 year old male who presented to [facility] ER for evaluation of chest pain. On Thursday morning, he states that he felt unwell. He had some chest pressure that resolve on its own after two hours. Yesterday he again had similar symptoms of feeling unwell that resolved on their own. This morning he woke up with sudden onset of chest pressure and intermittent stabbing episodes of pain.   He went to sleep upright in the chair with no improvement. He then tried to eat and use the restroom. He noted decreased strength in the left arm but not radiating pain. He felt unwell and short of breath. Denies nausea, vomiting, or diaphoresis.  He noted multiple episodes of worsening of his pain with exertion.  He attempted to move a chair with his son but the symptoms worsened. After sitting down to rest they improved.      Upon presentation to [facility], he was having 5/10 chest pressure. Initial SL NTG had minimal change. The second SL NTG improved his pain to 3/10. His troponin returned positive.  He was a given a 3 SL NTG and IV morphine and his systolic blood pressure dropped into the 70s.  His pain improved to a  1.5/10. At that time cardiology was consulted because the initial EKG revealed anterior and lateral ST changes. The repeat ekg showed improvement.  It was recommended that the patient transfer to [facility] in the event an emergent angiogram is needed. The patient was treated with IV heparin.     Upon arrival he noted a mild increase in the pressure to 2/10.  He is not having any stabbing pain. He does state the pain is worse with expiration. Early today he thought he had reproducible chest wall pain on the left but it is not  currently present. He has no associated sob, diaphoresis or nausea.     He denies any history of diabetes, hypertension or hyperlipidemia. He gained 50 pounds following a knee replacement a few years ago. He started a new diet a few weeks ago (protein shakes only, no supplements) and has lost 16 pounds. He previously smoked but quit in 1998. He has no family history of CAD, CVA or cancer.     He and his family had been dealing with ongoing upper respiratory illness since the [place] holiday. He noted he got sick on 12/26 and was started on a 10 day course of antibiotics on 1/2.  His symptoms finally improved approximatly 10 days ago.    Hospital Course   Lavell Mercer was admitted on 2/10/2018.  The following problems were addressed during his hospitalization:    52 year old male who was admitted on 2/10/2018, transferred from [facility] for NSTEMI.       NSTEMI: Cardiac cath completed with HONEY.   - Cards follow up.   - DAPT.         Code Status   Full Code       Primary Care Physician   Hipolito Le    Physical Exam   Temp: 98.6  F (37  C) Temp src: Oral BP: 135/88   Heart Rate: 77 Resp: 16 SpO2: 98 % O2 Device: None (Room air) Oxygen Delivery: 2 LPM  Vitals:    02/11/18 0712 02/12/18 0500 02/13/18 0530   Weight: 112.7 kg (248 lb 8 oz) 112.8 kg (248 lb 10.9 oz) 111.6 kg (246 lb 1.6 oz)     Vital Signs with Ranges  Temp:  [98.1  F (36.7  C)-98.9  F (37.2  C)] 98.6  F (37  C)  Heart Rate:  [47-77] 77  Resp:  [8-22] 16  BP: ()/() 135/88  SpO2:  [92 %-100 %] 98 %  I/O last 3 completed shifts:  In: 1225 [P.O.:200; I.V.:1025]  Out: 1300 [Urine:1300]    GENERAL: Alert and oriented. NAD. Conversational, appropriate. Pleasant.   HEENT: Normocephalic. EOMI. No icterus or injection. Nares normal.   LUNGS: Clear to auscultation. No dyspnea at rest.   HEART: Regular rate. Extremities perfused.   ABDOMEN: Soft, nontender, and nondistended. Positive bowel sounds.   EXTREMITIES: No LE edema noted.   NEUROLOGIC: Moves  extremities x4. No acute focal neurologic abnormalities noted.        Discharge Disposition   Discharged to home  Condition at discharge: Stable    Consultations This Hospital Stay   CARDIAC REHAB IP CONSULT  CARDIOLOGY IP CONSULT  PHARMACY TO DOSE HEPARIN  PHARMACY TO DOSE HEPARIN  CARDIAC REHAB IP CONSULT  NUTRITION SERVICES ADULT IP CONSULT  PHARMACY IP CONSULT  PHARMACY IP CONSULT  SMOKING CESSATION PROGRAM IP CONSULT  SMOKING CESSATION PROGRAM IP CONSULT    Time Spent on this Encounter   I Bunnycon Aguiar, personally saw the patient today and spent greater than 30 minutes discharging this patient.    Discharge Orders     CARDIAC REHAB REFERRAL     Follow-Up with Cardiac Advanced Practice Provider     Reason for your hospital stay   Coronary artery disease.     Follow-up and recommended labs and tests    Follow up with primary care provider, Hipolito Le, within 7 days for hospital follow- up.  The following labs/tests are recommended: cbc/bmp.    Follow up with Cardiology as directed.     Activity   Your activity upon discharge: activity as tolerated     Full Code     Diet   Follow this diet upon discharge: Orders Placed This Encounter     Low Saturated Fat Na <2400 mg       Discharge Medications   Current Discharge Medication List      START taking these medications    Details   aspirin EC 81 MG EC tablet Take 1 tablet (81 mg) by mouth daily  Qty: 30 tablet, Refills: 0    Associated Diagnoses: ACS (acute coronary syndrome) (H)      nitroGLYcerin (NITROSTAT) 0.4 MG sublingual tablet For chest pain place 1 tablet under the tongue every 5 minutes for 3 doses. If symptoms persist 5 minutes after 1st dose call 911.  Qty: 25 tablet, Refills: 0    Associated Diagnoses: ACS (acute coronary syndrome) (H)      atorvastatin (LIPITOR) 20 MG tablet Take 1 tablet (20 mg) by mouth daily  Qty: 30 tablet, Refills: 0    Associated Diagnoses: ACS (acute coronary syndrome) (H)      lisinopril (PRINIVIL/ZESTRIL) 2.5 MG  tablet Take 1 tablet (2.5 mg) by mouth daily  Qty: 30 tablet, Refills: 0    Associated Diagnoses: ACS (acute coronary syndrome) (H)      metoprolol tartrate (LOPRESSOR) 25 MG tablet Take 0.5 tablets (12.5 mg) by mouth 2 times daily  Qty: 30 tablet, Refills: 0    Associated Diagnoses: ACS (acute coronary syndrome) (H)      clopidogrel (PLAVIX) 75 MG tablet Take 1 tablet (75 mg) by mouth daily  Qty: 30 tablet, Refills: 0    Associated Diagnoses: ACS (acute coronary syndrome) (H)           Allergies   No Known Allergies  Data   Most Recent 3 CBC's:  Recent Labs   Lab Test  02/13/18   0533  02/12/18   0523  02/10/18   1823   WBC  5.9  6.3  8.0   HGB  14.9  15.1  14.9   MCV  88  89  87   PLT  190  179  218      Most Recent 3 BMP's:  Recent Labs   Lab Test  02/13/18   0533  02/12/18   0523  02/10/18   1325   NA  139  141  138   POTASSIUM  4.0  4.0  3.9   CHLORIDE  107  110*  107   CO2  25  25  25   BUN  9  12  13   CR  0.82  0.80  0.72   ANIONGAP  7  6  6   BRAN  8.5  8.5  9.2   GLC  97  102*  103*     Most Recent 2 LFT's:  Recent Labs   Lab Test  02/10/18   1325   AST  37   ALT  60   ALKPHOS  70   BILITOTAL  0.9     Most Recent INR's and Anticoagulation Dosing History:  Anticoagulation Dose History     Recent Dosing and Labs Latest Ref Rng & Units 2/10/2018    INR 0.86 - 1.14 1.01        Most Recent 3 Troponin's:  Recent Labs   Lab Test  02/11/18   0605  02/11/18   0015  02/10/18   1823   02/10/18   1321   TROPI  6.823*  7.662*  4.274*   < >   --    TROPONIN   --    --    --    --   1.31*    < > = values in this interval not displayed.     Most Recent Cholesterol Panel:  Recent Labs   Lab Test  02/11/18   0605   CHOL  186   LDL  87   HDL  24*   TRIG  373*     Most Recent 6 Bacteria Isolates From Any Culture (See EPIC Reports for Culture Details):No lab results found.  Most Recent TSH, T4 and A1c Labs:  Recent Labs   Lab Test  02/11/18   0605   A1C  5.1     Results for orders placed or performed during the hospital  encounter of 02/10/18   XR Chest Port 1 View    Narrative    CHEST PORTABLE ONE VIEW   2/10/2018 2:14 PM     HISTORY: Chest pain.    COMPARISON: None.    FINDINGS: Heart size normal. Lungs clear.      Impression    IMPRESSION: Negative.    CHRISTOPHER SOUSA MD

## 2018-02-13 NOTE — PROGRESS NOTES
"Cardiology Progress Note          Assessment and Plan:         1) NSTEMI s/p HONEY to OM yesterday.  Also has  of RCA/inferior branch of OM.      PLAN  - On appropriate medical therapy  - decreased atorvastatin to 20 MG daily and metoprolol to 12.5 MG BID.  - OK to d/c today if no issues with rehab  - will order TEN follow up in 2 weeks at [facility]        Interval History:     No CP or SOB. Slept well.              Review of Systems:   As per subjective, otherwise 5 systems reviewed and negative.           Physical Exam:   Blood pressure 120/87, temperature 98.6  F (37  C), temperature source Oral, resp. rate 18, height 1.753 m (5' 9\"), weight 111.6 kg (246 lb 1.6 oz), SpO2 98 %.      Vital Sign Ranges  Temperature Temp  Av.5  F (36.9  C)  Min: 98.1  F (36.7  C)  Max: 98.9  F (37.2  C)   Blood pressure Systolic (24hrs), Av , Min:90 , Max:139        Diastolic (24hrs), Av, Min:60, Max:101      Pulse No Data Recorded   Respirations Resp  Avg: 15  Min: 8  Max: 22   Pulse oximetry SpO2  Av.9 %  Min: 92 %  Max: 100 %         Intake/Output Summary (Last 24 hours) at 18 0933  Last data filed at 18 0800   Gross per 24 hour   Intake             1465 ml   Output             1300 ml   Net              165 ml       Constitutional: NAD  Skin: Warm and dry  Neck: No JVD  Lungs: CTA  Cardiovascular: RRR, no m/r/g  Abdomen: Soft, non tender.  Extremities and Back: No LE edema. No hematoma or bruit at cath site.   Neurological: Nonfocal           Medications:     I have reviewed this patient's current medications.              Data:     Labs reviewed.     Tele: ROYCE Asencio M.D.    "

## 2018-02-13 NOTE — PLAN OF CARE
Problem: Patient Care Overview  Goal: Plan of Care/Patient Progress Review  Outcome: Improving  Vitals stable and rhythm remains SB with first degree AVB.  Pt had one episode of chest pain, MD notified.  TR Band removed this evening, site C/D/I without hematoma.  Voiding adequate amounts.  Plan is for CR tomorrow.

## 2018-02-13 NOTE — DISCHARGE INSTRUCTIONS
PLEASE REVIEW YOUR CORONARY ANGIOGRAM, ANGIOPLASTY AND STENT PLACEMENT BOOKLET AND YOUR HEART MEDICATION HANDOUT.

## 2018-02-13 NOTE — PROVIDER NOTIFICATION
MD Notification    Notified Person:  MD    Notified Persons Name:  Dr. Bhagat     Notification Date/Time:  2/12/18  9020    Notification Interaction:  Talked with Physician    Purpose of Notification:  Mild left sided chest pain     Orders Received:  No new orders     Comments:  EKG completed, no new changed noted from previous EKG's  Pt placed on oxygen NC 2L   Completely chest pain free at the time RN spoke with MD

## 2018-02-13 NOTE — PROGRESS NOTES
02/13/18 1056   Quick Adds   Type of Visit Initial Occupational Therapy Evaluation   Living Environment   Lives With spouse   Living Arrangements house  (split entry)   Home Accessibility stairs to enter home;stairs within home   Number of Stairs to Enter Home 2   Number of Stairs Within Home 8   Transportation Available car   Self-Care   Usual Activity Tolerance moderate   Current Activity Tolerance moderate   Regular Exercise no   Equipment Currently Used at Home none   Functional Level Prior   Ambulation 0-->independent   Transferring 0-->independent   Toileting 0-->independent   Bathing 0-->independent   Dressing 0-->independent   Eating 0-->independent   Communication 0-->understands/communicates without difficulty   Swallowing 0-->swallows foods/liquids without difficulty   Cognition 0 - no cognition issues reported   Fall history within last six months no   Prior Functional Level Comment [employer] [place] [place]   General Information   Onset of Illness/Injury or Date of Surgery - Date 02/10/18   Referring Physician Jose Alejo MD   Patient/Family Goals Statement return home   Additional Occupational Profile Info/Pertinent History of Current Problem NSTEMI s/p HONEY to OM yesterday.  Also has  of RCA/inferior branch of OM. troponin peaked. 7.692   Precautions/Limitations (MI precautions)   Heart Disease Risk Factors Lack of physical activity;Dislipidemia;Overweight;Family history;Gender   Cognitive Status Examination   Orientation orientation to person, place and time   Level of Consciousness alert   Able to Follow Commands WNL/WFL   Personal Safety (Cognitive) WNL/WFL   Memory intact   Attention No deficits were identified   Organization/Problem Solving No deficits were identified   Executive Function No deficits were identified   Sensory Examination   Sensory Quick Adds No deficits were identified   Pain Assessment   Patient Currently in Pain No   Transfer Skills   Transfer Comments  "Independent with ADLs and functional mobility   Instrumental Activities of Daily Living (IADL)   Previous Responsibilities work;driving;finances;medication management;yardwork;shopping;laundry;housekeeping;meal prep   Activities of Daily Living Analysis   Impairments Contributing to Impaired Activities of Daily Living post surgical precautions  (decreased activity tolerance)   Clinical Impression   Criteria for Skilled Therapeutic Interventions Met yes, treatment indicated   OT Diagnosis decreased IADL's   Influenced by the following impairments impaired activity tolerance, MI precautions, CAD risk factors   Assessment of Occupational Performance 1-3 Performance Deficits   Identified Performance Deficits impaired IADL's driving, return to work, snowblowing, etc   Clinical Decision Making (Complexity) Low complexity   Therapy Frequency (Eval and treatment only)   Anticipated Discharge Disposition Home with Outpatient Therapy;Home with Assist  (A with IADL's ie snowblowing, driving, OP CR at [facility])   Risks and Benefits of Treatment have been explained. Yes   Patient, Family & other staff in agreement with plan of care Yes   [facility] AM-PAC  \"6 Clicks\" Daily Activity Inpatient Short Form   1. Putting on and taking off regular lower body clothing? 4 - None   2. Bathing (including washing, rinsing, drying)? 4 - None   3. Toileting, which includes using toilet, bedpan or urinal? 4 - None   4. Putting on and taking off regular upper body clothing? 4 - None   5. Taking care of personal grooming such as brushing teeth? 4 - None   6. Eating meals? 4 - None   Daily Activity Raw Score (Score out of 24.Lower scores equate to lower levels of function) 24   Total Evaluation Time   Total Evaluation Time (Minutes) 10     "

## 2018-02-13 NOTE — PLAN OF CARE
Problem: Cardiac Catheterization (Diagnostic/Interventional) (Adult)  Goal: Signs and Symptoms of Listed Potential Problems Will be Absent, Minimized or Managed (Cardiac Catheterization)  Signs and symptoms of listed potential problems will be absent, minimized or managed by discharge/transition of care (reference Cardiac Catheterization (Diagnostic/Interventional) (Adult) CPG).   Outcome: Improving    Discharge to home.Will follow up with PCP and cardiology as instructed. Discharge education/medications complete. Transport arranged with wife.

## 2018-02-14 ENCOUNTER — CARE COORDINATION (OUTPATIENT)
Dept: CARDIOLOGY | Facility: CLINIC | Age: 53
End: 2018-02-14

## 2018-02-14 NOTE — PROGRESS NOTES
Patient was evaluated by cardiology while inpatient for NSTEMI and underwent coronary angiogram resulting in HONEY to OM. Called patient and left  to discuss any post hospital d/c questions she may have, review medication changes, and confirm f/u appts. RN advised patient in  to call clinic asap if there were any issues obtaining rx for Plavix. RN advised patient in  that patient has an apt scheduled on 2/20/18 for OP cardiac rehab and on 2/27/18 with TEN Raeann Mccurdy. Patient advised in  to call clinic with any cardiac related questions or concerns prior to his apt on 2/27/18.

## 2018-02-15 LAB
INTERPRETATION ECG - MUSE: NORMAL

## 2018-02-20 ENCOUNTER — HOSPITAL ENCOUNTER (OUTPATIENT)
Dept: CARDIAC REHAB | Facility: CLINIC | Age: 53
End: 2018-02-20
Attending: INTERNAL MEDICINE
Payer: COMMERCIAL

## 2018-02-20 ENCOUNTER — TRANSFERRED RECORDS (OUTPATIENT)
Dept: HEALTH INFORMATION MANAGEMENT | Facility: CLINIC | Age: 53
End: 2018-02-20

## 2018-02-20 DIAGNOSIS — I24.9 ACS (ACUTE CORONARY SYNDROME) (H): ICD-10-CM

## 2018-02-20 PROCEDURE — 40000116 ZZH STATISTIC OP CR VISIT

## 2018-02-20 PROCEDURE — 40000575 ZZH STATISTIC OP CARDIAC VISIT #2

## 2018-02-20 PROCEDURE — 93798 PHYS/QHP OP CAR RHAB W/ECG: CPT

## 2018-02-20 PROCEDURE — 93797 PHYS/QHP OP CAR RHAB WO ECG: CPT

## 2018-02-23 ENCOUNTER — HOSPITAL ENCOUNTER (OUTPATIENT)
Dept: CARDIAC REHAB | Facility: CLINIC | Age: 53
End: 2018-02-23
Attending: INTERNAL MEDICINE
Payer: COMMERCIAL

## 2018-02-23 PROCEDURE — 40000116 ZZH STATISTIC OP CR VISIT: Performed by: OCCUPATIONAL THERAPIST

## 2018-02-23 PROCEDURE — 93798 PHYS/QHP OP CAR RHAB W/ECG: CPT | Performed by: OCCUPATIONAL THERAPIST

## 2018-02-26 ENCOUNTER — HOSPITAL ENCOUNTER (OUTPATIENT)
Dept: CARDIAC REHAB | Facility: CLINIC | Age: 53
End: 2018-02-26
Attending: INTERNAL MEDICINE
Payer: COMMERCIAL

## 2018-02-26 PROCEDURE — 93798 PHYS/QHP OP CAR RHAB W/ECG: CPT

## 2018-02-26 PROCEDURE — 40000116 ZZH STATISTIC OP CR VISIT

## 2018-02-27 PROBLEM — I25.10 CORONARY ARTERY DISEASE INVOLVING NATIVE CORONARY ARTERY OF NATIVE HEART WITHOUT ANGINA PECTORIS: Status: ACTIVE | Noted: 2018-02-27

## 2018-02-28 PROBLEM — E78.2 MIXED HYPERLIPIDEMIA: Status: ACTIVE | Noted: 2018-02-28

## 2018-03-01 ENCOUNTER — OFFICE VISIT (OUTPATIENT)
Dept: CARDIOLOGY | Facility: CLINIC | Age: 53
End: 2018-03-01
Attending: INTERNAL MEDICINE
Payer: COMMERCIAL

## 2018-03-01 VITALS
HEIGHT: 69 IN | BODY MASS INDEX: 34.36 KG/M2 | SYSTOLIC BLOOD PRESSURE: 118 MMHG | WEIGHT: 232 LBS | DIASTOLIC BLOOD PRESSURE: 80 MMHG | HEART RATE: 60 BPM

## 2018-03-01 DIAGNOSIS — I24.9 ACS (ACUTE CORONARY SYNDROME) (H): ICD-10-CM

## 2018-03-01 DIAGNOSIS — I25.10 CORONARY ARTERY DISEASE INVOLVING NATIVE CORONARY ARTERY OF NATIVE HEART WITHOUT ANGINA PECTORIS: Primary | ICD-10-CM

## 2018-03-01 PROCEDURE — 99214 OFFICE O/P EST MOD 30 MIN: CPT | Performed by: PHYSICIAN ASSISTANT

## 2018-03-01 RX ORDER — METOPROLOL SUCCINATE 25 MG/1
25 TABLET, EXTENDED RELEASE ORAL DAILY
Qty: 30 TABLET | Refills: 11 | Status: SHIPPED | OUTPATIENT
Start: 2018-03-01 | End: 2018-05-14

## 2018-03-01 NOTE — PATIENT INSTRUCTIONS
Thank you for your [facility] visit today. Your provider has recommended the following:  Medication Changes:  STOP the metoprolol tartrate (short acting)  START metoprolol sucinate (long acting) 25mg once a day  Recommendations:  Fasting blood work prior to your follow up  Follow-up:  See Dr Kern for cardiology follow up/establish care in about 3 months.    Reminder:  Please bring in your current medication list or your medication, over the counter supplements and vitamin bottles as we will review these at each office visit.

## 2018-03-01 NOTE — LETTER
3/1/2018      Hipolito Le MD  [facility] [address]  [facility] [address]      RE: Lavell YARITZA Mercer       Dear Colleague,    I had the pleasure of seeing Lavell Mercer in the [facility].    Service Date: 03/01/2018      HISTORY OF PRESENT ILLNESS:  Mr. Mercer is a 52-year-old gentleman who presents to Cardiology office today for followup after a recent hospitalization at [facility] secondary to a non-STEMI.      His history includes dyslipidemia, mainly with low HDL and elevated triglycerides, along with a distant history of tobacco use; however, otherwise he was relatively healthy.  Recently, he developed rather acute onset of chest discomfort over a couple of days due to persistent symptoms.  He sought care initially in the [facility].  He was found to have a NSTEMI.  He was transferred to [facility].  He ultimately underwent coronary angiography which revealed 2-vessel coronary artery disease.  There was a 100% occlusion of the proximal RCA with collaterals from the circumflex and LAD.  There was also a 90% focal stenosis in the proximal OM1 and a 100% occlusion of the inferior branch of the OM1 with collaterals from the circumflex and LAD.  He also was noted to have a 50% stenosis in the proximal circumflex and a 50-60% focal stenosis with post-stenotic dilatation of the proximal D2.  He did undergo drug-eluting stent placement across the proximal OM1 lesion which was felt to be the culprit.  An attempt was made at opening the RCA and the inferior branch of the OM1.  However, this was unsuccessful and it was felt that both of these lesions were likely chronic total occlusions.  The patient was started on appropriate medical therapy and did well.  He was able to discharge home.  He presents today for followup.      He states overall he is feeling well.  He has not had any recurrent anginal chest discomfort.   He occasionally has experienced a fleeting chest discomfort that has lasted a couple of seconds.  He is participating in cardiac rehab without any cardiovascular limitations.  He is tolerating his current medical therapy without any side effects.  He did question today if at some point there may be medications that he could discontinue.  He is working on weight loss through diet and exercise changes.  He actually had started a weight loss program a couple of weeks prior to his myocardial infarction.  He is stuck with this program.  He has lost about 25 pounds already.  He does mention that he has noted some erectile dysfunction.  He thinks that he had noticed this a little bit before his myocardial infarction, but it does seem a slight bit worse after the myocardial infarction/initiation of his recent medications.  He also told me that inadvertently he has missed some of the evening doses of metoprolol as it is difficult to remember to take the medication twice a day.      CURRENT CARDIAC MEDICATIONS:   1.  Metoprolol tartrate 12.5 mg b.i.d.   2.  Aspirin 81 mg daily.   3.  Sublingual nitro p.r.n.   4.  Atorvastatin 20 mg daily.   5.  Lisinopril 2.5 mg daily.   6.  Plavix 75 mg daily.      The remainder of his medications, allergies and review of systems were reviewed and as are documented separately.      PHYSICAL EXAMINATION:   GENERAL:  The patient is a 52-year-old gentleman who appears his stated age.  He is in no apparent distress.   VITAL SIGNS:  His blood pressure is 118/80, pulse is 60, weight is 232 pounds.   LUNGS:  Breathing is nonlabored.  Lungs are clear to auscultation.   CARDIAC:  Regular rate and rhythm, no murmurs appreciated.   NEUROLOGIC:  Alert and oriented.      ASSESSMENT AND PLAN:  Robyn is a pleasant 52-year-old gentleman who was recently diagnosed with coronary artery disease.  He had a NSTEMI.  He was found to have 2-vessel coronary disease.  He did have a drug-eluting stent placed to the  proximal OM.  The inferior branch of this artery had a .  The RCA also had a .  He was noted to have collaterals to both of these vessels.  He is not having any recurrent anginal symptoms.  He is participating in cardiac rehab without difficulty.  He is on a good medication regimen.  We discussed the indications and benefits as well as the risks of all the medications.  Again, he did inquire if at some point he would be able to discontinue any medications.  I did discuss with him that potentially at the 1 year post-MI les we could consider discontinuing the clopidogrel and possibly the lisinopril (no prior h/o HTN and normal EF).      I congratulated him on his weight loss efforts so far and encouraged him to continue with this.      Since he is having trouble remembering the evening dose of the metoprolol tartrate, I did suggest that we change to Toprol-XL for once daily dosing.        In regard to his erectile dysfunction, he will see if he has any improvement with symptoms in the above-mentioned change in medical therapy.  If not, I have asked him to call the office and we could do a trial off of beta blocker to see if he has any improvement in symptoms.  Otherwise, he may need to discuss additional medication with his primary care provider.      We will plan to see the patient back in the Cardiology Clinic in about 3 months with repeat lipid profile prior to that office visit.  I would like to get him in to see one of our cardiologists, Dr. Kern, to establish care.      Thank you for allowing me to participate in the care of this pleasant patient.         ROLF FERRERA PA-C             D: 2018   T: 2018   MT: LIANNA      Name:     LOUIE VEGAS   MRN:      [id number]        Account:      [id number]   :      1965           Service Date: 2018      Document: [id number]           Outpatient Encounter Prescriptions as of 3/1/2018   Medication Sig Dispense Refill      metoprolol succinate (TOPROL-XL) 25 MG 24 hr tablet Take 1 tablet (25 mg) by mouth daily 30 tablet 11     aspirin EC 81 MG EC tablet Take 1 tablet (81 mg) by mouth daily 30 tablet 0     nitroGLYcerin (NITROSTAT) 0.4 MG sublingual tablet For chest pain place 1 tablet under the tongue every 5 minutes for 3 doses. If symptoms persist 5 minutes after 1st dose call 911. 25 tablet 0     atorvastatin (LIPITOR) 20 MG tablet Take 1 tablet (20 mg) by mouth daily 30 tablet 0     lisinopril (PRINIVIL/ZESTRIL) 2.5 MG tablet Take 1 tablet (2.5 mg) by mouth daily 30 tablet 0     clopidogrel (PLAVIX) 75 MG tablet Take 1 tablet (75 mg) by mouth daily 30 tablet 0     [DISCONTINUED] metoprolol tartrate (LOPRESSOR) 25 MG tablet Take 0.5 tablets (12.5 mg) by mouth 2 times daily 30 tablet 0     No facility-administered encounter medications on file as of 3/1/2018.        Again, thank you for allowing me to participate in the care of your patient.      Sincerely,    Raeann Mccurdy PA-C     [facility]

## 2018-03-01 NOTE — PROGRESS NOTES
Please see separate dictation for HPI, PHYSICAL EXAM AND IMPRESSION/PLAN.    CURRENT MEDICATIONS:  Current Outpatient Prescriptions   Medication Sig Dispense Refill     aspirin EC 81 MG EC tablet Take 1 tablet (81 mg) by mouth daily 30 tablet 0     nitroGLYcerin (NITROSTAT) 0.4 MG sublingual tablet For chest pain place 1 tablet under the tongue every 5 minutes for 3 doses. If symptoms persist 5 minutes after 1st dose call 911. 25 tablet 0     atorvastatin (LIPITOR) 20 MG tablet Take 1 tablet (20 mg) by mouth daily 30 tablet 0     lisinopril (PRINIVIL/ZESTRIL) 2.5 MG tablet Take 1 tablet (2.5 mg) by mouth daily 30 tablet 0     metoprolol tartrate (LOPRESSOR) 25 MG tablet Take 0.5 tablets (12.5 mg) by mouth 2 times daily 30 tablet 0     clopidogrel (PLAVIX) 75 MG tablet Take 1 tablet (75 mg) by mouth daily 30 tablet 0       ALLERGIES:   No Known Allergies    PAST MEDICAL HISTORY:  History reviewed. No pertinent past medical history.    PAST SURGICAL HISTORY:  Past Surgical History:   Procedure Laterality Date     JOINT REPLACEMENT         SOCIAL HISTORY:  Social History     Social History     Marital status:      Spouse name: N/A     Number of children: N/A     Years of education: N/A     Social History Main Topics     Smoking status: Former Smoker     Types: Cigarettes     Quit date: 1/1/1998     Smokeless tobacco: Never Used     Alcohol use Yes     Drug use: No     Sexual activity: Not Asked     Other Topics Concern     None     Social History Narrative       FAMILY HISTORY:  Family History   Problem Relation Age of Onset     Arrhythmia Father      Myocardial Infarction Maternal Grandfather 70       Review of Systems:  Skin:  Negative       Eyes:  Negative      ENT:  Negative      Respiratory:  Negative       Cardiovascular:  Negative      Gastroenterology: Negative      Genitourinary:  Positive for urinary frequency    Musculoskeletal:  Negative      Neurologic:  Negative      Psychiatric:  Negative       Heme/Lymph/Imm:  Negative      Endocrine:  Negative         Reviewed. Remainder of the note dictated.    Raeann Mccurdy PA-C

## 2018-03-01 NOTE — LETTER
3/1/2018    Hipolito Le MD  [facility] [address]  [facility] [address]    RE: Lavell Mercer       Dear Colleague,    I had the pleasure of seeing Lavell Mercer in the [facility].    Please see separate dictation for HPI, PHYSICAL EXAM AND IMPRESSION/PLAN.    CURRENT MEDICATIONS:  Current Outpatient Prescriptions   Medication Sig Dispense Refill     aspirin EC 81 MG EC tablet Take 1 tablet (81 mg) by mouth daily 30 tablet 0     nitroGLYcerin (NITROSTAT) 0.4 MG sublingual tablet For chest pain place 1 tablet under the tongue every 5 minutes for 3 doses. If symptoms persist 5 minutes after 1st dose call 911. 25 tablet 0     atorvastatin (LIPITOR) 20 MG tablet Take 1 tablet (20 mg) by mouth daily 30 tablet 0     lisinopril (PRINIVIL/ZESTRIL) 2.5 MG tablet Take 1 tablet (2.5 mg) by mouth daily 30 tablet 0     metoprolol tartrate (LOPRESSOR) 25 MG tablet Take 0.5 tablets (12.5 mg) by mouth 2 times daily 30 tablet 0     clopidogrel (PLAVIX) 75 MG tablet Take 1 tablet (75 mg) by mouth daily 30 tablet 0       ALLERGIES:   No Known Allergies    PAST MEDICAL HISTORY:  History reviewed. No pertinent past medical history.    PAST SURGICAL HISTORY:  Past Surgical History:   Procedure Laterality Date     JOINT REPLACEMENT         SOCIAL HISTORY:  Social History     Social History     Marital status:      Spouse name: N/A     Number of children: N/A     Years of education: N/A     Social History Main Topics     Smoking status: Former Smoker     Types: Cigarettes     Quit date: 1/1/1998     Smokeless tobacco: Never Used     Alcohol use Yes     Drug use: No     Sexual activity: Not Asked     Other Topics Concern     None     Social History Narrative       FAMILY HISTORY:  Family History   Problem Relation Age of Onset     Arrhythmia Father      Myocardial Infarction Maternal Grandfather 70       Review of Systems:  Skin:  Negative       Eyes:  Negative       ENT:  Negative      Respiratory:  Negative       Cardiovascular:  Negative      Gastroenterology: Negative      Genitourinary:  Positive for urinary frequency    Musculoskeletal:  Negative      Neurologic:  Negative      Psychiatric:  Negative      Heme/Lymph/Imm:  Negative      Endocrine:  Negative         Reviewed. Remainder of the note dictated.    Raeann Mccurdy PA-C        Service Date: 03/01/2018      HISTORY OF PRESENT ILLNESS:  Mr. Mercer is a 52-year-old gentleman who presents to Cardiology office today for followup after a recent hospitalization at [facility] secondary to a non-STEMI.      His history includes dyslipidemia, mainly with low HDL and elevated triglycerides, along with a distant history of tobacco use; however, otherwise he was relatively healthy.  Recently, he developed rather acute onset of chest discomfort over a couple of days due to persistent symptoms.  He sought care initially in the [facility] Emergency Department.  He was found to have a NSTEMI.  He was transferred to [facility].  He ultimately underwent coronary angiography which revealed 2-vessel coronary artery disease.  There was a 100% occlusion of the proximal RCA with collaterals from the circumflex and LAD.  There was also a 90% focal stenosis in the proximal OM1 and a 100% occlusion of the inferior branch of the OM1 with collaterals from the circumflex and LAD.  He also was noted to have a 50% stenosis in the proximal circumflex and a 50-60% focal stenosis with post-stenotic dilatation of the proximal D2.  He did undergo drug-eluting stent placement across the proximal OM1 lesion which was felt to be the culprit.  An attempt was made at opening the RCA and the inferior branch of the OM1.  However, this was unsuccessful and it was felt that both of these lesions were likely chronic total occlusions.  The patient was started on appropriate medical therapy and did well.  He was able to discharge  home.  He presents today for followup.      He states overall he is feeling well.  He has not had any recurrent anginal chest discomfort.  He occasionally has experienced a fleeting chest discomfort that has lasted a couple of seconds.  He is participating in cardiac rehab without any cardiovascular limitations.  He is tolerating his current medical therapy without any side effects.  He did question today if at some point there may be medications that he could discontinue.  He is working on weight loss through diet and exercise changes.  He actually had started a weight loss program a couple of weeks prior to his myocardial infarction.  He is stuck with this program.  He has lost about 25 pounds already.  He does mention that he has noted some erectile dysfunction.  He thinks that he had noticed this a little bit before his myocardial infarction, but it does seem a slight bit worse after the myocardial infarction/initiation of his recent medications.  He also told me that inadvertently he has missed some of the evening doses of metoprolol as it is difficult to remember to take the medication twice a day.      CURRENT CARDIAC MEDICATIONS:   1.  Metoprolol tartrate 12.5 mg b.i.d.   2.  Aspirin 81 mg daily.   3.  Sublingual nitro p.r.n.   4.  Atorvastatin 20 mg daily.   5.  Lisinopril 2.5 mg daily.   6.  Plavix 75 mg daily.      The remainder of his medications, allergies and review of systems were reviewed and as are documented separately.      PHYSICAL EXAMINATION:   GENERAL:  The patient is a 52-year-old gentleman who appears his stated age.  He is in no apparent distress.   VITAL SIGNS:  His blood pressure is 118/80, pulse is 60, weight is 232 pounds.   LUNGS:  Breathing is nonlabored.  Lungs are clear to auscultation.   CARDIAC:  Regular rate and rhythm, no murmurs appreciated.   NEUROLOGIC:  Alert and oriented.      ASSESSMENT AND PLAN:  Robyn is a pleasant 52-year-old gentleman who was recently diagnosed with  coronary artery disease.  He had a NSTEMI.  He was found to have 2-vessel coronary disease.  He did have a drug-eluting stent placed to the proximal OM.  The inferior branch of this artery had a .  The RCA also had a .  He was noted to have collaterals to both of these vessels.  He is not having any recurrent anginal symptoms.  He is participating in cardiac rehab without difficulty.  He is on a good medication regimen.  We discussed the indications and benefits as well as the risks of all the medications.  Again, he did inquire if at some point he would be able to discontinue any medications.  I did discuss with him that potentially at the 1 year post-MI les we could consider discontinuing the clopidogrel and possibly the lisinopril (no prior h/o HTN and normal EF).      I congratulated him on his weight loss efforts so far and encouraged him to continue with this.      Since he is having trouble remembering the evening dose of the metoprolol tartrate, I did suggest that we change to Toprol-XL for once daily dosing.        In regard to his erectile dysfunction, he will see if he has any improvement with symptoms in the above-mentioned change in medical therapy.  If not, I have asked him to call the office and we could do a trial off of beta blocker to see if he has any improvement in symptoms.  Otherwise, he may need to discuss additional medication with his primary care provider.      We will plan to see the patient back in the Cardiology Clinic in about 3 months with repeat lipid profile prior to that office visit.  I would like to get him in to see one of our cardiologists, Dr. Kern, to establish care.      Thank you for allowing me to participate in the care of this pleasant patient.         ROLF FERRERA PA-C             D: 2018   T: 2018   MT: LIANNA      Name:     LOUIE VEGAS   MRN:      [id number]        Account:      [id number]   :      1965           Service  Date: 03/01/2018      Document: [id number]        Thank you for allowing me to participate in the care of your patient.      Sincerely,     Raeann Mccurdy PA-C     [facility]    cc:   Rey Asencio MD  [address]  [place] [address]

## 2018-03-01 NOTE — PROGRESS NOTES
Service Date: 03/01/2018      HISTORY OF PRESENT ILLNESS:  Mr. Mercer is a 52-year-old gentleman who presents to Cardiology office today for followup after a recent hospitalization at [facility] secondary to a non-STEMI.      His history includes dyslipidemia, mainly with low HDL and elevated triglycerides, along with a distant history of tobacco use; however, otherwise he was relatively healthy.  Recently, he developed rather acute onset of chest discomfort over a couple of days due to persistent symptoms.  He sought care initially in the [facility] Emergency Department.  He was found to have a NSTEMI.  He was transferred to [facility].  He ultimately underwent coronary angiography which revealed 2-vessel coronary artery disease.  There was a 100% occlusion of the proximal RCA with collaterals from the circumflex and LAD.  There was also a 90% focal stenosis in the proximal OM1 and a 100% occlusion of the inferior branch of the OM1 with collaterals from the circumflex and LAD.  He also was noted to have a 50% stenosis in the proximal circumflex and a 50-60% focal stenosis with post-stenotic dilatation of the proximal D2.  He did undergo drug-eluting stent placement across the proximal OM1 lesion which was felt to be the culprit.  An attempt was made at opening the RCA and the inferior branch of the OM1.  However, this was unsuccessful and it was felt that both of these lesions were likely chronic total occlusions.  The patient was started on appropriate medical therapy and did well.  He was able to discharge home.  He presents today for followup.      He states overall he is feeling well.  He has not had any recurrent anginal chest discomfort.  He occasionally has experienced a fleeting chest discomfort that has lasted a couple of seconds.  He is participating in cardiac rehab without any cardiovascular limitations.  He is tolerating his current medical therapy without any side effects.  He  did question today if at some point there may be medications that he could discontinue.  He is working on weight loss through diet and exercise changes.  He actually had started a weight loss program a couple of weeks prior to his myocardial infarction.  He is stuck with this program.  He has lost about 25 pounds already.  He does mention that he has noted some erectile dysfunction.  He thinks that he had noticed this a little bit before his myocardial infarction, but it does seem a slight bit worse after the myocardial infarction/initiation of his recent medications.  He also told me that inadvertently he has missed some of the evening doses of metoprolol as it is difficult to remember to take the medication twice a day.      CURRENT CARDIAC MEDICATIONS:   1.  Metoprolol tartrate 12.5 mg b.i.d.   2.  Aspirin 81 mg daily.   3.  Sublingual nitro p.r.n.   4.  Atorvastatin 20 mg daily.   5.  Lisinopril 2.5 mg daily.   6.  Plavix 75 mg daily.      The remainder of his medications, allergies and review of systems were reviewed and as are documented separately.      PHYSICAL EXAMINATION:   GENERAL:  The patient is a 52-year-old gentleman who appears his stated age.  He is in no apparent distress.   VITAL SIGNS:  His blood pressure is 118/80, pulse is 60, weight is 232 pounds.   LUNGS:  Breathing is nonlabored.  Lungs are clear to auscultation.   CARDIAC:  Regular rate and rhythm, no murmurs appreciated.   NEUROLOGIC:  Alert and oriented.      ASSESSMENT AND PLAN:  Robyn is a pleasant 52-year-old gentleman who was recently diagnosed with coronary artery disease.  He had a NSTEMI.  He was found to have 2-vessel coronary disease.  He did have a drug-eluting stent placed to the proximal OM.  The inferior branch of this artery had a .  The RCA also had a .  He was noted to have collaterals to both of these vessels.  He is not having any recurrent anginal symptoms.  He is participating in cardiac rehab without difficulty.   He is on a good medication regimen.  We discussed the indications and benefits as well as the risks of all the medications.  Again, he did inquire if at some point he would be able to discontinue any medications.  I did discuss with him that potentially at the 1 year post-MI les we could consider discontinuing the clopidogrel and possibly the lisinopril (no prior h/o HTN and normal EF).      I congratulated him on his weight loss efforts so far and encouraged him to continue with this.      Since he is having trouble remembering the evening dose of the metoprolol tartrate, I did suggest that we change to Toprol-XL for once daily dosing.        In regard to his erectile dysfunction, he will see if he has any improvement with symptoms in the above-mentioned change in medical therapy.  If not, I have asked him to call the office and we could do a trial off of beta blocker to see if he has any improvement in symptoms.  Otherwise, he may need to discuss additional medication with his primary care provider.      We will plan to see the patient back in the Cardiology Clinic in about 3 months with repeat lipid profile prior to that office visit.  I would like to get him in to see one of our cardiologists, Dr. Kern, to establish care.      Thank you for allowing me to participate in the care of this pleasant patient.         ROLF FERRERA PA-C             D: 2018   T: 2018   MT: LIANNA      Name:     LOUIE VEGAS   MRN:      [id number]        Account:      [id number]   :      1965           Service Date: 2018      Document: [id number]

## 2018-03-01 NOTE — MR AVS SNAPSHOT
After Visit Summary   3/1/2018    Lavell Mercer    MRN: [id number]           Patient Information     Date Of Birth          1965        Visit Information        Provider Department      3/1/2018 1:30 PM Raeann Mccurdy PA-C [facility]        Today's Diagnoses     Coronary artery disease involving native coronary artery of native heart without angina pectoris    -  1    ACS (acute coronary syndrome) (H)          Care Instructions    Thank you for your  Heart Care visit today. Your provider has recommended the following:  Medication Changes:  STOP the metoprolol tartrate (short acting)  START metoprolol sucinate (long acting) 25mg once a day  Recommendations:  Fasting blood work prior to your follow up  Follow-up:  See Dr Kern for cardiology follow up/establish care in about 3 months.    Reminder:  Please bring in your current medication list or your medication, over the counter supplements and vitamin bottles as we will review these at each office visit.                  Follow-ups after your visit        Additional Services     Follow-Up with Cardiologist                 Your next 10 appointments already scheduled     Mar 02, 2018  1:00 PM CST   Cardiac Treatment with Rh Cardiac Rehab 1   [facility] ([facility])    [address] [place] [address], [address] [address]   [phone]            Mar 05, 2018  8:00 AM CST   Cardiac Treatment with Rh Cardiac Rehab 1   [facility] ([facility])    [address] [place] [address], [address]  [facility] [address]   [phone]            Mar 07, 2018  3:00 PM CST   Cardiac Treatment with Rh Cardiac Rehab 1   [facility] ([facility])    [address] [place] [address], [facility]  [facility] [address]   [phone]            Mar 09, 2018  1:00 PM CST   Cardiac Treatment with Rh Cardiac Rehab 1   [facility] ([facility])    [address], [address]  [facility] [address]   [phone]            Mar 12, 2018  8:00 AM CDT   Cardiac Treatment with Rh Cardiac Rehab 1    ([facility])    [address], [address]  [facility] [address]   [phone]            Mar 15, 2018  6:45 AM CDT   Cardiac Treatment with Rh Cardiac Rehab 1    ([facility])    [address], [address]  [facility] [address]   [phone]            May 14, 2018  7:45 AM CDT   LAB with RU LAB   [facility] ([facility])    [address] [place] [address] [address]  [facility] [address]   [phone]           Please do not eat 10-12 hours before your appointment if you are coming in fasting for labs on lipids, cholesterol, or glucose (sugar). This does not apply to pregnant women. Water, hot tea and black coffee (with nothing added) are okay. Do not drink other fluids, diet soda or chew gum.            May 14, 2018  8:45 AM CDT   Return Visit with Herve Kern MD   [facility] ([facility])    [address] [place] [address] [address]  [facility] [address]   [phone]              Future tests that were ordered for you today     Open Future Orders        Priority Expected Expires Ordered    ALT Routine 5/30/2018 3/1/2019 3/1/2018    Follow-Up with Cardiologist Routine 5/30/2018 3/1/2019 3/1/2018    Lipid Profile Routine 5/30/2018 3/1/2019 3/1/2018            Who to contact     If you have questions or need follow up information about today's clinic visit or your schedule please contact [facility] directly at [phone].  Normal or non-critical lab and imaging results will be communicated to you by MyChart, letter or phone within 4 business days after the clinic has received the results.  "If you do not hear from us within 7 days, please contact the clinic through [employer] or phone. If you have a critical or abnormal lab result, we will notify you by phone as soon as possible.  Submit refill requests through [employer] or call your pharmacy and they will forward the refill request to us. Please allow 3 business days for your refill to be completed.          Additional Information About Your Visit        [facility] Information     [employer] lets you send messages to your doctor, view your test results, renew your prescriptions, schedule appointments and more. To sign up, go to [email or web address] . Click on \"Log in\" on the left side of the screen, which will take you to the Welcome page. Then click on \"Sign up Now\" on the right side of the page.     You will be asked to enter the access code listed below, as well as some personal information. Please follow the directions to create your username and password.     Your access code is: [email or web address]  Expires: 2018  7:44 AM     Your access code will  in 90 days. If you need help or a new code, please call your [facility] clinic or [phone].        Care EveryWhere ID     This is your Care EveryWhere ID. This could be used by other organizations to access your [facility] medical records  [id number]        Your Vitals Were     Pulse Height BMI (Body Mass Index)             60 1.753 m (5' 9\") 34.26 kg/m2          Blood Pressure from Last 3 Encounters:   18 118/80   18 135/88   02/10/18 (!) 142/103    Weight from Last 3 Encounters:   18 105.2 kg (232 lb)   18 111.6 kg (246 lb 1.6 oz)   02/10/18 108.1 kg (238 lb 6 oz)              We Performed the Following     Follow-Up with Cardiac Advanced Practice Provider          Today's Medication Changes          These changes are accurate as of 3/1/18  2:51 PM.  If you have any questions, ask your nurse or doctor.               Start taking these medicines.        Dose/Directions    " metoprolol succinate 25 MG 24 hr tablet   Commonly known as:  TOPROL-XL   Used for:  Coronary artery disease involving native coronary artery of native heart without angina pectoris   Started by:  Raeann Mccurdy PA-C        Dose:  25 mg   Take 1 tablet (25 mg) by mouth daily   Quantity:  30 tablet   Refills:  11         Stop taking these medicines if you haven't already. Please contact your care team if you have questions.     metoprolol tartrate 25 MG tablet   Commonly known as:  LOPRESSOR   Stopped by:  Raeann Mccurdy PA-C                Where to get your medicines      These medications were sent to [id number] - [place] [id number] [address] [address]     Phone:  [phone]     metoprolol succinate 25 MG 24 hr tablet                Primary Care Provider Office Phone # Fax #    Hipolito Le [phone]       [facility] [address] [facility] [address]        Equal Access to Services     [facility] : Hadii aad ku hadasho Soomaali, waaxda luqadaha, qaybta kaalmada adeegyada, waxay idiin hayaan lance kay . So [facility] [phone].    ATENCIÓN: Si habla español, tiene a curry disposición servicios gratuitos de asistencia lingüística. [facility] [phone].    We comply with applicable federal civil rights laws and [place] laws. We do not discriminate on the basis of race, color, national origin, age, disability, sex, sexual orientation, or gender identity.            Thank you!     Thank you for choosing [facility]  for your care. Our goal is always to provide you with excellent care. Hearing back from our patients is one way we can continue to improve our services. Please take a few minutes to complete the written survey that you may receive in the mail after your visit with us. Thank you!             Your Updated Medication List - Protect others around  you: Learn how to safely use, store and throw away your medicines at www.disposemymeds.org.          This list is accurate as of 3/1/18  2:51 PM.  Always use your most recent med list.                   Brand Name Dispense Instructions for use Diagnosis    aspirin 81 MG EC tablet     30 tablet    Take 1 tablet (81 mg) by mouth daily    ACS (acute coronary syndrome) (H)       atorvastatin 20 MG tablet    LIPITOR    30 tablet    Take 1 tablet (20 mg) by mouth daily    ACS (acute coronary syndrome) (H)       clopidogrel 75 MG tablet    PLAVIX    30 tablet    Take 1 tablet (75 mg) by mouth daily    ACS (acute coronary syndrome) (H)       lisinopril 2.5 MG tablet    PRINIVIL/Zestril    30 tablet    Take 1 tablet (2.5 mg) by mouth daily    ACS (acute coronary syndrome) (H)       metoprolol succinate 25 MG 24 hr tablet    TOPROL-XL    30 tablet    Take 1 tablet (25 mg) by mouth daily    Coronary artery disease involving native coronary artery of native heart without angina pectoris       nitroGLYcerin 0.4 MG sublingual tablet    NITROSTAT    25 tablet    For chest pain place 1 tablet under the tongue every 5 minutes for 3 doses. If symptoms persist 5 minutes after 1st dose call 911.    ACS (acute coronary syndrome) (H)

## 2018-03-02 ENCOUNTER — HOSPITAL ENCOUNTER (OUTPATIENT)
Dept: CARDIAC REHAB | Facility: CLINIC | Age: 53
End: 2018-03-02
Attending: INTERNAL MEDICINE
Payer: COMMERCIAL

## 2018-03-02 PROCEDURE — 40000116 ZZH STATISTIC OP CR VISIT

## 2018-03-02 PROCEDURE — 93798 PHYS/QHP OP CAR RHAB W/ECG: CPT

## 2018-03-05 ENCOUNTER — HOSPITAL ENCOUNTER (OUTPATIENT)
Dept: CARDIAC REHAB | Facility: CLINIC | Age: 53
End: 2018-03-05
Attending: INTERNAL MEDICINE
Payer: COMMERCIAL

## 2018-03-05 PROCEDURE — 40000116 ZZH STATISTIC OP CR VISIT

## 2018-03-05 PROCEDURE — 93798 PHYS/QHP OP CAR RHAB W/ECG: CPT

## 2018-03-07 ENCOUNTER — HOSPITAL ENCOUNTER (OUTPATIENT)
Dept: CARDIAC REHAB | Facility: CLINIC | Age: 53
End: 2018-03-07
Attending: INTERNAL MEDICINE
Payer: COMMERCIAL

## 2018-03-07 PROCEDURE — 93798 PHYS/QHP OP CAR RHAB W/ECG: CPT

## 2018-03-07 PROCEDURE — 40000116 ZZH STATISTIC OP CR VISIT

## 2018-03-09 ENCOUNTER — HOSPITAL ENCOUNTER (OUTPATIENT)
Dept: CARDIAC REHAB | Facility: CLINIC | Age: 53
End: 2018-03-09
Attending: INTERNAL MEDICINE
Payer: COMMERCIAL

## 2018-03-09 PROCEDURE — 40000116 ZZH STATISTIC OP CR VISIT

## 2018-03-09 PROCEDURE — 93798 PHYS/QHP OP CAR RHAB W/ECG: CPT

## 2018-03-15 ENCOUNTER — HOSPITAL ENCOUNTER (OUTPATIENT)
Dept: CARDIAC REHAB | Facility: CLINIC | Age: 53
End: 2018-03-15
Attending: INTERNAL MEDICINE
Payer: COMMERCIAL

## 2018-03-15 PROCEDURE — 40000116 ZZH STATISTIC OP CR VISIT: Performed by: OCCUPATIONAL THERAPIST

## 2018-03-15 PROCEDURE — 93798 PHYS/QHP OP CAR RHAB W/ECG: CPT | Performed by: OCCUPATIONAL THERAPIST

## 2018-03-19 ENCOUNTER — TELEPHONE (OUTPATIENT)
Dept: CARDIOLOGY | Facility: CLINIC | Age: 53
End: 2018-03-19

## 2018-03-19 NOTE — TELEPHONE ENCOUNTER
"Patient called w/ updates. Last seen by Raeann Mccurdy PA-C. Pt w/ erectile dysfunction. Switched his short acting BB to Metoprolol XL 25 mg QD. Per last OV note, \" If not, I have asked him to call the office and we could do a trial off of beta blocker to see if he has any improvement in symptoms.\" Reviewed question with , recommended pt to trial off BB for 1 week. Will update Raeann Mccurdy PA-C. JOSÉ MIGUEL Joyner    "

## 2018-03-20 ENCOUNTER — HOSPITAL ENCOUNTER (OUTPATIENT)
Dept: CARDIAC REHAB | Facility: CLINIC | Age: 53
End: 2018-03-20
Attending: INTERNAL MEDICINE
Payer: COMMERCIAL

## 2018-03-20 PROCEDURE — 93798 PHYS/QHP OP CAR RHAB W/ECG: CPT

## 2018-03-20 PROCEDURE — 40000116 ZZH STATISTIC OP CR VISIT

## 2018-04-05 NOTE — TELEPHONE ENCOUNTER
Called patient to f/u on medication hold per  d/t ED. Pt states he continues to hold his Toprol XL 25 mg. Holding the BB helped a little bit but not completely. Pt has follow up with  5/14/18. Will route to  to update. JOSÉ MIGUEL Joyner

## 2018-05-14 ENCOUNTER — OFFICE VISIT (OUTPATIENT)
Dept: CARDIOLOGY | Facility: CLINIC | Age: 53
End: 2018-05-14
Attending: PHYSICIAN ASSISTANT
Payer: COMMERCIAL

## 2018-05-14 VITALS
SYSTOLIC BLOOD PRESSURE: 118 MMHG | HEIGHT: 69 IN | WEIGHT: 227.5 LBS | HEART RATE: 66 BPM | BODY MASS INDEX: 33.69 KG/M2 | DIASTOLIC BLOOD PRESSURE: 90 MMHG

## 2018-05-14 DIAGNOSIS — I25.10 CORONARY ARTERY DISEASE INVOLVING NATIVE CORONARY ARTERY OF NATIVE HEART WITHOUT ANGINA PECTORIS: ICD-10-CM

## 2018-05-14 LAB
ALT SERPL W P-5'-P-CCNC: 38 U/L (ref 0–70)
CHOLEST SERPL-MCNC: 178 MG/DL
HDLC SERPL-MCNC: 34 MG/DL
LDLC SERPL CALC-MCNC: 84 MG/DL
NONHDLC SERPL-MCNC: 144 MG/DL
TRIGL SERPL-MCNC: 298 MG/DL

## 2018-05-14 PROCEDURE — 99214 OFFICE O/P EST MOD 30 MIN: CPT | Performed by: INTERNAL MEDICINE

## 2018-05-14 PROCEDURE — 36415 COLL VENOUS BLD VENIPUNCTURE: CPT | Performed by: PHYSICIAN ASSISTANT

## 2018-05-14 PROCEDURE — 80061 LIPID PANEL: CPT | Performed by: PHYSICIAN ASSISTANT

## 2018-05-14 PROCEDURE — 84460 ALANINE AMINO (ALT) (SGPT): CPT | Performed by: PHYSICIAN ASSISTANT

## 2018-05-14 NOTE — LETTER
5/14/2018    Hipolito Le MD  [facility] [address]  [facility] [address]    RE: Lavell Mercer       Dear Colleague,    I had the pleasure of seeing Lavell Mercer in the [facility].    HPI and Plan:   See dictation    Orders Placed This Encounter   Procedures     Follow-Up with Cardiologist       No orders of the defined types were placed in this encounter.      Medications Discontinued During This Encounter   Medication Reason     metoprolol succinate (TOPROL-XL) 25 MG 24 hr tablet          Encounter Diagnosis   Name Primary?     Coronary artery disease involving native coronary artery of native heart without angina pectoris        CURRENT MEDICATIONS:  Current Outpatient Prescriptions   Medication Sig Dispense Refill     aspirin EC 81 MG EC tablet Take 1 tablet (81 mg) by mouth daily 30 tablet 0     atorvastatin (LIPITOR) 20 MG tablet Take 1 tablet (20 mg) by mouth daily 30 tablet 0     clopidogrel (PLAVIX) 75 MG tablet Take 1 tablet (75 mg) by mouth daily 30 tablet 0     lisinopril (PRINIVIL/ZESTRIL) 2.5 MG tablet Take 1 tablet (2.5 mg) by mouth daily 30 tablet 0     nitroGLYcerin (NITROSTAT) 0.4 MG sublingual tablet For chest pain place 1 tablet under the tongue every 5 minutes for 3 doses. If symptoms persist 5 minutes after 1st dose call 911. 25 tablet 0       ALLERGIES   No Known Allergies    PAST MEDICAL HISTORY:  No past medical history on file.    PAST SURGICAL HISTORY:  Past Surgical History:   Procedure Laterality Date     JOINT REPLACEMENT         FAMILY HISTORY:  Family History   Problem Relation Age of Onset     Arrhythmia Father      Myocardial Infarction Maternal Grandfather 70     No Known Problems Sister      No Known Problems Brother      No Known Problems Sister        SOCIAL HISTORY:  Social History     Social History     Marital status:      Spouse name: N/A     Number of children: N/A     Years of education: N/A  "    Social History Main Topics     Smoking status: Former Smoker     Types: Cigarettes     Quit date: 1/1/1998     Smokeless tobacco: Never Used     Alcohol use Yes     Drug use: No     Sexual activity: Not Asked     Other Topics Concern     None     Social History Narrative       Review of Systems:  Skin:  Negative       Eyes:  Negative      ENT:  Negative      Respiratory:  Negative       Cardiovascular:  Negative      Gastroenterology: Negative      Genitourinary:  Negative      Musculoskeletal:  Positive for back pain    Neurologic:  Negative      Psychiatric:  Negative      Heme/Lymph/Imm:  Negative      Endocrine:  Negative        Physical Exam:  Vitals: /90 (BP Location: Right arm, Patient Position: Sitting, Cuff Size: Adult Large)  Pulse 66  Ht 1.753 m (5' 9\")  Wt 103.2 kg (227 lb 8 oz)  BMI 33.6 kg/m2    Constitutional:  cooperative;in no acute distress        Skin:  warm and dry to the touch          Head:  normocephalic        Eyes:  sclera white        Lymph:No Cervical lymphadenopathy present     ENT:  dentition good        Neck:  no stiffness        Respiratory:  clear to auscultation         Cardiac: regular rhythm;normal S1 and S2                pulses full and equal                                        GI:  abdomen soft        Extremities and Muscular Skeletal:  no edema              Neurological:  affect appropriate        Psych:  Alert and Oriented x 3        CC  Raeann Mccurdy PA-C  [address] [place] [address]                Thank you for allowing me to participate in the care of your patient.      Sincerely,     Herve Kern MD     [facility]    cc:   Raeann Mccurdy PA-C  [address] [place] [address]        "

## 2018-05-14 NOTE — LETTER
5/14/2018      Hipolito Le MD  [facility] [address]  [facility] [address]      RE: Lavell VIGIL Sylvie       Dear Colleague,    I had the pleasure of seeing Lavell Mercer in the [facility].    Service Date: 05/14/2018      HISTORY OF PRESENT ILLNESS:  Mr. Mercer is a pleasant 52-year-old gentleman with a history of coronary artery disease.  He was admitted to the hospital in February of this year with a non-STEMI and underwent a cardiac catheterization revealing 100% occlusion of the proximal right coronary artery with left to right collaterals, along with disease of the OM for which he underwent PCI with a drug-eluting stent.  He is also noted to have a 50% stenosis in the proximal circumflex and the second diagonal.  He saw Raeann Mccurdy on 03/01/2018 and returns in routine followup with blood work that was performed today.      The patient's lab work shows a total cholesterol 178, HDL of 34, LDL of 84, and triglycerides of 298.  The patient has been feeling well since we have seen him last, denying any chest pain, pressure, shortness of breath, orthopnea or paroxysmal nocturnal dyspnea.      He was having difficulties with erectile dysfunction and had stopped his metoprolol.  His erectile dysfunction has resolved.  He is taking the remainder of his medications.       The patient is exercising regularly and has lost a significant amount of weight since we have seen him last.      Please see my separate note with his full physical examination.      IMPRESSION AND PLAN:   1.  Coronary artery disease - the patient is clinically asymptomatic with no anginal symptoms at the present time.  He will remain on dual antiplatelet therapy until 02/2019 at which time we will stop his clopidogrel in favor of aspirin 81 mg daily lifelong thereafter. I encouraged the patient to increase his atorvastatin from 20 to 40 mg daily to adhere to the American College of  Cardiology/American Heart Association Guidelines.  We also do not have his LDL to less than 70.  At this time, the patient would like to defer and would like to remain on atorvastatin 20 mg daily.  I did instruct him that he could contact the clinic if he changes his mind and my nurse can call in a prescription for 40 mg.      2.  Hypertension - the patient's blood pressure is currently well controlled.  I will continue his lisinopril dose unchanged.  He is now off his beta blocker which I think is reasonable.      3.  Erectile dysfunction - this is resolved with discontinuation of his beta blocker.      4.  Dyslipidemia - as above, I would like to increase his atorvastatin to 40 mg daily, but the patient is currently refusing.      5.  Obesity - I spent much of the visit talking about diet and lifestyle changes to help with his continued weight loss.  I have encouraged the patient and congratulated him on his current weight loss.      I will plan to see Mr. Mercer back in routine followup in 9 months.  At that point, if the patient is still stable, we will discontinue his clopidogrel.         LIZ MARTÍNEZ MD             D: 2018   T: 2018   MT: LIANNA      Name:     LOUIE MERCER   MRN:      [id number]        Account:      [id number]   :      1965           Service Date: 2018      Document: [id number]           Outpatient Encounter Prescriptions as of 2018   Medication Sig Dispense Refill     aspirin EC 81 MG EC tablet Take 1 tablet (81 mg) by mouth daily 30 tablet 0     atorvastatin (LIPITOR) 20 MG tablet Take 1 tablet (20 mg) by mouth daily 30 tablet 0     clopidogrel (PLAVIX) 75 MG tablet Take 1 tablet (75 mg) by mouth daily 30 tablet 0     lisinopril (PRINIVIL/ZESTRIL) 2.5 MG tablet Take 1 tablet (2.5 mg) by mouth daily 30 tablet 0     nitroGLYcerin (NITROSTAT) 0.4 MG sublingual tablet For chest pain place 1 tablet under the tongue every 5 minutes for 3 doses. If symptoms  persist 5 minutes after 1st dose call 911. 25 tablet 0     [DISCONTINUED] metoprolol succinate (TOPROL-XL) 25 MG 24 hr tablet Take 1 tablet (25 mg) by mouth daily (Patient not taking: Reported on 5/14/2018) 30 tablet 11     No facility-administered encounter medications on file as of 5/14/2018.        Again, thank you for allowing me to participate in the care of your patient.      Sincerely,    Herve Kern MD     [facility]

## 2018-05-14 NOTE — PROGRESS NOTES
Service Date: 05/14/2018      HISTORY OF PRESENT ILLNESS:  Mr. Mercer is a pleasant 52-year-old gentleman with a history of coronary artery disease.  He was admitted to the hospital in February of this year with a non-STEMI and underwent a cardiac catheterization revealing 100% occlusion of the proximal right coronary artery with left to right collaterals, along with disease of the OM for which he underwent PCI with a drug-eluting stent.  He is also noted to have a 50% stenosis in the proximal circumflex and the second diagonal.  He saw Raeann Mccurdy on 03/01/2018 and returns in routine followup with blood work that was performed today.      The patient's lab work shows a total cholesterol 178, HDL of 34, LDL of 84, and triglycerides of 298.  The patient has been feeling well since we have seen him last, denying any chest pain, pressure, shortness of breath, orthopnea or paroxysmal nocturnal dyspnea.      He was having difficulties with erectile dysfunction and had stopped his metoprolol.  His erectile dysfunction has resolved.  He is taking the remainder of his medications.       The patient is exercising regularly and has lost a significant amount of weight since we have seen him last.      Please see my separate note with his full physical examination.      IMPRESSION AND PLAN:   1.  Coronary artery disease - the patient is clinically asymptomatic with no anginal symptoms at the present time.  He will remain on dual antiplatelet therapy until 02/2019 at which time we will stop his clopidogrel in favor of aspirin 81 mg daily lifelong thereafter. I encouraged the patient to increase his atorvastatin from 20 to 40 mg daily to adhere to the American College of Cardiology/American Heart Association Guidelines.  We also do not have his LDL to less than 70.  At this time, the patient would like to defer and would like to remain on atorvastatin 20 mg daily.  I did instruct him that he could contact the clinic if  he changes his mind and my nurse can call in a prescription for 40 mg.      2.  Hypertension - the patient's blood pressure is currently well controlled.  I will continue his lisinopril dose unchanged.  He is now off his beta blocker which I think is reasonable.      3.  Erectile dysfunction - this is resolved with discontinuation of his beta blocker.      4.  Dyslipidemia - as above, I would like to increase his atorvastatin to 40 mg daily, but the patient is currently refusing.      5.  Obesity - I spent much of the visit talking about diet and lifestyle changes to help with his continued weight loss.  I have encouraged the patient and congratulated him on his current weight loss.      I will plan to see Mr. Mercer back in routine followup in 9 months.  At that point, if the patient is still stable, we will discontinue his clopidogrel.         LIZ MARTÍNEZ MD             D: 2018   T: 2018   MT: LIANNA      Name:     LOUIE MERCER   MRN:      [id number]        Account:      [id number]   :      1965           Service Date: 2018      Document: [id number]

## 2018-05-14 NOTE — MR AVS SNAPSHOT
"              After Visit Summary   5/14/2018    Lavell Mercer    MRN: [id number]           Patient Information     Date Of Birth          1965        Visit Information        Provider Department      5/14/2018 8:45 AM Herve Kern MD [facility]        Today's Diagnoses     Coronary artery disease involving native coronary artery of native heart without angina pectoris           Follow-ups after your visit        Additional Services     Follow-Up with Cardiologist                 Future tests that were ordered for you today     Open Future Orders        Priority Expected Expires Ordered    Follow-Up with Cardiologist Routine 2/8/2019 5/14/2019 5/14/2018            Who to contact     If you have questions or need follow up information about today's clinic visit or your schedule please contact [facility] directly at [phone].  Normal or non-critical lab and imaging results will be communicated to you by [employer], letter or phone within 4 business days after the clinic has received the results. If you do not hear from us within 7 days, please contact the clinic through [employer] or phone. If you have a critical or abnormal lab result, we will notify you by phone as soon as possible.  Submit refill requests through [employer] or call your pharmacy and they will forward the refill request to us. Please allow 3 business days for your refill to be completed.          Additional Information About Your Visit        MyChart Information     [employer] lets you send messages to your doctor, view your test results, renew your prescriptions, schedule appointments and more. To sign up, go to [email or web address] . Click on \"Log in\" on the left side of the screen, which will take you to the Welcome page. Then click on \"Sign up Now\" on the right side of the page.     You will be asked to enter the access code listed below, as well as " "some personal information. Please follow the directions to create your username and password.     Your access code is: [email or web address]  Expires: 2018 11:25 AM     Your access code will  in 90 days. If you need help or a new code, please call your [place] clinic or [phone].        Care EveryWhere ID     This is your Care EveryWhere ID. This could be used by other organizations to access your [place] medical records  [id number]        Your Vitals Were     Pulse Height BMI (Body Mass Index)             66 1.753 m (5' 9\") 33.6 kg/m2          Blood Pressure from Last 3 Encounters:   18 118/90   18 118/80   18 135/88    Weight from Last 3 Encounters:   18 103.2 kg (227 lb 8 oz)   18 105.2 kg (232 lb)   18 111.6 kg (246 lb 1.6 oz)              We Performed the Following     Follow-Up with Cardiologist          Today's Medication Changes          These changes are accurate as of 18 11:25 AM.  If you have any questions, ask your nurse or doctor.               Stop taking these medicines if you haven't already. Please contact your care team if you have questions.     metoprolol succinate 25 MG 24 hr tablet   Commonly known as:  TOPROL-XL   Stopped by:  Herve Kern MD                    Primary Care Provider Office Phone # Fax #    Hipolito Le [phone]       [facility] [address] [address]        Equal Access to Services     [facility]: Hadandressa Ascencio, gerri pace, qaybradu kaalnataly gunnalysia rangelin hayaan adeeg kharash la'aan . So [facility] [phone].    ATENCIÓN: Si habla español, tiene a curry disposición servicios gratuitos de asistencia lingüística. Llame al [phone].    We comply with applicable federal civil rights laws and [place] laws. We do not discriminate on the basis of race, color, national origin, age, disability, sex, sexual orientation, or gender identity.       "      Thank you!     Thank you for choosing [facility]  for your care. Our goal is always to provide you with excellent care. Hearing back from our patients is one way we can continue to improve our services. Please take a few minutes to complete the written survey that you may receive in the mail after your visit with us. Thank you!             Your Updated Medication List - Protect others around you: Learn how to safely use, store and throw away your medicines at www.disposemymeds.org.          This list is accurate as of 5/14/18 11:25 AM.  Always use your most recent med list.                   Brand Name Dispense Instructions for use Diagnosis    aspirin 81 MG EC tablet     30 tablet    Take 1 tablet (81 mg) by mouth daily    ACS (acute coronary syndrome) (H)       atorvastatin 20 MG tablet    LIPITOR    30 tablet    Take 1 tablet (20 mg) by mouth daily    ACS (acute coronary syndrome) (H)       clopidogrel 75 MG tablet    PLAVIX    30 tablet    Take 1 tablet (75 mg) by mouth daily    ACS (acute coronary syndrome) (H)       lisinopril 2.5 MG tablet    PRINIVIL/Zestril    30 tablet    Take 1 tablet (2.5 mg) by mouth daily    ACS (acute coronary syndrome) (H)       nitroGLYcerin 0.4 MG sublingual tablet    NITROSTAT    25 tablet    For chest pain place 1 tablet under the tongue every 5 minutes for 3 doses. If symptoms persist 5 minutes after 1st dose call 911.    ACS (acute coronary syndrome) (H)

## 2018-05-14 NOTE — PROGRESS NOTES
HPI and Plan:   See dictation    Orders Placed This Encounter   Procedures     Follow-Up with Cardiologist       No orders of the defined types were placed in this encounter.      Medications Discontinued During This Encounter   Medication Reason     metoprolol succinate (TOPROL-XL) 25 MG 24 hr tablet          Encounter Diagnosis   Name Primary?     Coronary artery disease involving native coronary artery of native heart without angina pectoris        CURRENT MEDICATIONS:  Current Outpatient Prescriptions   Medication Sig Dispense Refill     aspirin EC 81 MG EC tablet Take 1 tablet (81 mg) by mouth daily 30 tablet 0     atorvastatin (LIPITOR) 20 MG tablet Take 1 tablet (20 mg) by mouth daily 30 tablet 0     clopidogrel (PLAVIX) 75 MG tablet Take 1 tablet (75 mg) by mouth daily 30 tablet 0     lisinopril (PRINIVIL/ZESTRIL) 2.5 MG tablet Take 1 tablet (2.5 mg) by mouth daily 30 tablet 0     nitroGLYcerin (NITROSTAT) 0.4 MG sublingual tablet For chest pain place 1 tablet under the tongue every 5 minutes for 3 doses. If symptoms persist 5 minutes after 1st dose call 911. 25 tablet 0       ALLERGIES   No Known Allergies    PAST MEDICAL HISTORY:  No past medical history on file.    PAST SURGICAL HISTORY:  Past Surgical History:   Procedure Laterality Date     JOINT REPLACEMENT         FAMILY HISTORY:  Family History   Problem Relation Age of Onset     Arrhythmia Father      Myocardial Infarction Maternal Grandfather 70     No Known Problems Sister      No Known Problems Brother      No Known Problems Sister        SOCIAL HISTORY:  Social History     Social History     Marital status:      Spouse name: N/A     Number of children: N/A     Years of education: N/A     Social History Main Topics     Smoking status: Former Smoker     Types: Cigarettes     Quit date: 1/1/1998     Smokeless tobacco: Never Used     Alcohol use Yes     Drug use: No     Sexual activity: Not Asked     Other Topics Concern     None     Social  "History Narrative       Review of Systems:  Skin:  Negative       Eyes:  Negative      ENT:  Negative      Respiratory:  Negative       Cardiovascular:  Negative      Gastroenterology: Negative      Genitourinary:  Negative      Musculoskeletal:  Positive for back pain    Neurologic:  Negative      Psychiatric:  Negative      Heme/Lymph/Imm:  Negative      Endocrine:  Negative        Physical Exam:  Vitals: /90 (BP Location: Right arm, Patient Position: Sitting, Cuff Size: Adult Large)  Pulse 66  Ht 1.753 m (5' 9\")  Wt 103.2 kg (227 lb 8 oz)  BMI 33.6 kg/m2    Constitutional:  cooperative;in no acute distress        Skin:  warm and dry to the touch          Head:  normocephalic        Eyes:  sclera white        Lymph:No Cervical lymphadenopathy present     ENT:  dentition good        Neck:  no stiffness        Respiratory:  clear to auscultation         Cardiac: regular rhythm;normal S1 and S2                pulses full and equal                                        GI:  abdomen soft        Extremities and Muscular Skeletal:  no edema              Neurological:  affect appropriate        Psych:  Alert and Oriented x 3        CC  Raeann Mccurdy PA-C  [address] [place] [address]              "

## 2022-09-28 RX ORDER — MULTIPLE VITAMINS W/ MINERALS TAB 9MG-400MCG
1 TAB ORAL DAILY
COMMUNITY

## 2022-09-28 RX ORDER — METOPROLOL SUCCINATE 50 MG/1
50 TABLET, EXTENDED RELEASE ORAL DAILY
COMMUNITY
Start: 2022-07-01

## 2022-09-28 RX ORDER — LACTOBACILLUS RHAMNOSUS GG 10B CELL
1 CAPSULE ORAL DAILY
COMMUNITY

## 2022-09-28 RX ORDER — ATORVASTATIN CALCIUM 40 MG/1
40 TABLET, FILM COATED ORAL DAILY
COMMUNITY
Start: 2022-08-23

## 2022-09-28 RX ORDER — COLCHICINE 0.6 MG/1
TABLET ORAL PRN
COMMUNITY
Start: 2022-07-02

## 2022-09-28 NOTE — PROGRESS NOTES
09/28/22 0942   Discharge Planning   Concerns to be Addressed no discharge needs identified;denies needs/concerns at this time   Living Arrangements   Is your private residence a single family home or apartment? Single family home   Number of Stairs, Within Home, Primary greater than 10 stairs   Stair Railings, Within Home, Primary railings safe and in good condition   Once home, are you able to live on one level? Yes   Which level? Lower Level   Which rooms are not on the main level? Kitchen   Bathroom Shower/Tub Tub/Shower unit   Support System   Do you have someone available to stay with you one or two nights once you are home? Yes   Medical Clearance   Date of Physical   (TBS)   Clinic Name [facility]   It is recommended that you call and check with any specialty providers before surgery to see if you need surgical clearance.  Do you see any specialty providers outside of your primary care provider? No   Blood   Known bleeding disorder or coagulopathy? No   Does the patient have any Adventist/cultural preferences related to blood products? No   Education   Has the patient scheduled or completed pre-op total joint education, either in class or online, in the last 12 months? Yes   What day did the patient complete, or plan to complete, pre-op total joint education? 09/27/22   Patient attended total joint pre-op class/received pre-op teaching  online

## 2022-10-10 ENCOUNTER — LAB (OUTPATIENT)
Dept: LAB | Facility: CLINIC | Age: 57
End: 2022-10-10
Attending: ORTHOPAEDIC SURGERY
Payer: COMMERCIAL

## 2022-10-10 DIAGNOSIS — Z01.812 PRE-OPERATIVE LABORATORY EXAMINATION: ICD-10-CM

## 2022-10-10 PROCEDURE — U0005 INFEC AGEN DETEC AMPLI PROBE: HCPCS

## 2022-10-10 PROCEDURE — U0003 INFECTIOUS AGENT DETECTION BY NUCLEIC ACID (DNA OR RNA); SEVERE ACUTE RESPIRATORY SYNDROME CORONAVIRUS 2 (SARS-COV-2) (CORONAVIRUS DISEASE [COVID-19]), AMPLIFIED PROBE TECHNIQUE, MAKING USE OF HIGH THROUGHPUT TECHNOLOGIES AS DESCRIBED BY CMS-2020-01-R: HCPCS

## 2022-10-11 LAB — SARS-COV-2 RNA RESP QL NAA+PROBE: NEGATIVE

## 2022-10-12 NOTE — PHARMACY-ADMISSION MEDICATION HISTORY
Med rec completed by preadmitting RN  Nurse Lindsay Rocha RN Wed Sep 28, 2022  9:50 AM     No further clarifications noted     Prior to Admission medications    Medication Sig Last Dose Taking? Auth Provider Long Term End Date   aspirin EC 81 MG EC tablet Take 1 tablet (81 mg) by mouth daily  Yes Bunny Aguiar,      atorvastatin (LIPITOR) 40 MG tablet Take 40 mg by mouth daily  Yes Reported, Patient Yes    lactobacillus rhamnosus, GG, (CULTURELL) capsule Take 1 capsule by mouth daily  Yes Reported, Patient     metoprolol succinate ER (TOPROL XL) 50 MG 24 hr tablet Take 50 mg by mouth daily  Yes Reported, Patient No    multivitamin w/minerals (MULTI-VITAMIN) tablet Take 1 tablet by mouth daily  Yes Reported, Patient     NONFORMULARY daily Fruit and vegetable supplement  Yes Reported, Patient     TART CHERRY PO Take by mouth daily  Yes Reported, Patient     colchicine (COLCYRS) 0.6 MG tablet as needed As needed for gout   Reported, Patient

## 2022-10-14 ENCOUNTER — ANESTHESIA (OUTPATIENT)
Dept: SURGERY | Facility: CLINIC | Age: 57
End: 2022-10-14
Payer: COMMERCIAL

## 2022-10-14 ENCOUNTER — APPOINTMENT (OUTPATIENT)
Dept: PHYSICAL THERAPY | Facility: CLINIC | Age: 57
End: 2022-10-14
Attending: ORTHOPAEDIC SURGERY
Payer: COMMERCIAL

## 2022-10-14 ENCOUNTER — APPOINTMENT (OUTPATIENT)
Dept: GENERAL RADIOLOGY | Facility: CLINIC | Age: 57
End: 2022-10-14
Attending: PHYSICIAN ASSISTANT
Payer: COMMERCIAL

## 2022-10-14 ENCOUNTER — HOSPITAL ENCOUNTER (OUTPATIENT)
Facility: CLINIC | Age: 57
Discharge: HOME | End: 2022-10-15
Attending: ORTHOPAEDIC SURGERY | Admitting: ORTHOPAEDIC SURGERY
Payer: COMMERCIAL

## 2022-10-14 ENCOUNTER — ANESTHESIA EVENT (OUTPATIENT)
Dept: SURGERY | Facility: CLINIC | Age: 57
End: 2022-10-14
Payer: COMMERCIAL

## 2022-10-14 DIAGNOSIS — Z96.652 S/P TKR (TOTAL KNEE REPLACEMENT) USING CEMENT, LEFT: Primary | ICD-10-CM

## 2022-10-14 PROCEDURE — 360N000077 HC SURGERY LEVEL 4, PER MIN: Performed by: ORTHOPAEDIC SURGERY

## 2022-10-14 PROCEDURE — 370N000017 HC ANESTHESIA TECHNICAL FEE, PER MIN: Performed by: ORTHOPAEDIC SURGERY

## 2022-10-14 PROCEDURE — 250N000013 HC RX MED GY IP 250 OP 250 PS 637: Performed by: PHYSICIAN ASSISTANT

## 2022-10-14 PROCEDURE — 258N000003 HC RX IP 258 OP 636: Performed by: PHYSICIAN ASSISTANT

## 2022-10-14 PROCEDURE — C1776 JOINT DEVICE (IMPLANTABLE): HCPCS | Performed by: ORTHOPAEDIC SURGERY

## 2022-10-14 PROCEDURE — 250N000011 HC RX IP 250 OP 636: Performed by: ORTHOPAEDIC SURGERY

## 2022-10-14 PROCEDURE — 272N000001 HC OR GENERAL SUPPLY STERILE: Performed by: ORTHOPAEDIC SURGERY

## 2022-10-14 PROCEDURE — 250N000013 HC RX MED GY IP 250 OP 250 PS 637: Performed by: ORTHOPAEDIC SURGERY

## 2022-10-14 PROCEDURE — 250N000011 HC RX IP 250 OP 636: Performed by: NURSE ANESTHETIST, CERTIFIED REGISTERED

## 2022-10-14 PROCEDURE — 999N000141 HC STATISTIC PRE-PROCEDURE NURSING ASSESSMENT: Performed by: ORTHOPAEDIC SURGERY

## 2022-10-14 PROCEDURE — 97110 THERAPEUTIC EXERCISES: CPT | Mod: GP

## 2022-10-14 PROCEDURE — 258N000003 HC RX IP 258 OP 636: Performed by: ANESTHESIOLOGY

## 2022-10-14 PROCEDURE — 250N000009 HC RX 250: Performed by: NURSE ANESTHETIST, CERTIFIED REGISTERED

## 2022-10-14 PROCEDURE — 710N000009 HC RECOVERY PHASE 1, LEVEL 1, PER MIN: Performed by: ORTHOPAEDIC SURGERY

## 2022-10-14 PROCEDURE — 97116 GAIT TRAINING THERAPY: CPT | Mod: GP

## 2022-10-14 PROCEDURE — 250N000013 HC RX MED GY IP 250 OP 250 PS 637: Performed by: ANESTHESIOLOGY

## 2022-10-14 PROCEDURE — 250N000011 HC RX IP 250 OP 636: Performed by: PHYSICIAN ASSISTANT

## 2022-10-14 PROCEDURE — 250N000009 HC RX 250: Performed by: ORTHOPAEDIC SURGERY

## 2022-10-14 PROCEDURE — 258N000001 HC RX 258: Performed by: ORTHOPAEDIC SURGERY

## 2022-10-14 PROCEDURE — 250N000011 HC RX IP 250 OP 636: Performed by: ANESTHESIOLOGY

## 2022-10-14 PROCEDURE — 97161 PT EVAL LOW COMPLEX 20 MIN: CPT | Mod: GP

## 2022-10-14 PROCEDURE — C1713 ANCHOR/SCREW BN/BN,TIS/BN: HCPCS | Performed by: ORTHOPAEDIC SURGERY

## 2022-10-14 PROCEDURE — 999N000065 XR KNEE PORT LEFT 1/2 VIEWS: Mod: LT

## 2022-10-14 PROCEDURE — 258N000003 HC RX IP 258 OP 636: Performed by: ORTHOPAEDIC SURGERY

## 2022-10-14 DEVICE — IBALANCE TKA, MODULAR TIBIAL TRAY, SZ 6
Type: IMPLANTABLE DEVICE | Site: KNEE | Status: FUNCTIONAL
Brand: ARTHREX®

## 2022-10-14 DEVICE — IBAL TKA FEM IMP CR,CEMENTED SZ 7,LFT
Type: IMPLANTABLE DEVICE | Site: KNEE | Status: FUNCTIONAL
Brand: ARTHREX®

## 2022-10-14 DEVICE — PATELLA IMPLANT DOME, 30 X 8MM, VIT-E
Type: IMPLANTABLE DEVICE | Site: KNEE | Status: FUNCTIONAL
Brand: ARTHREX®

## 2022-10-14 DEVICE — BONE CEMENT STRK SIMPLEX P SPEEDSET 6192-1-001: Type: IMPLANTABLE DEVICE | Site: KNEE | Status: FUNCTIONAL

## 2022-10-14 DEVICE — CR PLUS TIB BEARING, VIT E, SZ 6, 10MM
Type: IMPLANTABLE DEVICE | Site: KNEE | Status: FUNCTIONAL
Brand: ARTHREX®

## 2022-10-14 RX ORDER — HYDROXYZINE HYDROCHLORIDE 25 MG/1
25 TABLET, FILM COATED ORAL EVERY 6 HOURS PRN
Status: DISCONTINUED | OUTPATIENT
Start: 2022-10-14 | End: 2022-10-15 | Stop reason: HOSPADM

## 2022-10-14 RX ORDER — OXYCODONE HYDROCHLORIDE 5 MG/1
5 TABLET ORAL EVERY 4 HOURS PRN
Status: DISCONTINUED | OUTPATIENT
Start: 2022-10-14 | End: 2022-10-14 | Stop reason: HOSPADM

## 2022-10-14 RX ORDER — HYDROMORPHONE HCL IN WATER/PF 6 MG/30 ML
0.2 PATIENT CONTROLLED ANALGESIA SYRINGE INTRAVENOUS
Status: DISCONTINUED | OUTPATIENT
Start: 2022-10-14 | End: 2022-10-15 | Stop reason: HOSPADM

## 2022-10-14 RX ORDER — CEFAZOLIN SODIUM/WATER 2 G/20 ML
2 SYRINGE (ML) INTRAVENOUS
Status: COMPLETED | OUTPATIENT
Start: 2022-10-14 | End: 2022-10-14

## 2022-10-14 RX ORDER — PROPOFOL 10 MG/ML
INJECTION, EMULSION INTRAVENOUS CONTINUOUS PRN
Status: DISCONTINUED | OUTPATIENT
Start: 2022-10-14 | End: 2022-10-14

## 2022-10-14 RX ORDER — ACETAMINOPHEN 325 MG/1
975 TABLET ORAL ONCE
Status: COMPLETED | OUTPATIENT
Start: 2022-10-14 | End: 2022-10-14

## 2022-10-14 RX ORDER — NALOXONE HYDROCHLORIDE 0.4 MG/ML
0.4 INJECTION, SOLUTION INTRAMUSCULAR; INTRAVENOUS; SUBCUTANEOUS
Status: DISCONTINUED | OUTPATIENT
Start: 2022-10-14 | End: 2022-10-15 | Stop reason: HOSPADM

## 2022-10-14 RX ORDER — CEFAZOLIN SODIUM 2 G/100ML
2 INJECTION, SOLUTION INTRAVENOUS EVERY 8 HOURS
Status: COMPLETED | OUTPATIENT
Start: 2022-10-14 | End: 2022-10-15

## 2022-10-14 RX ORDER — NALOXONE HYDROCHLORIDE 0.4 MG/ML
0.2 INJECTION, SOLUTION INTRAMUSCULAR; INTRAVENOUS; SUBCUTANEOUS
Status: DISCONTINUED | OUTPATIENT
Start: 2022-10-14 | End: 2022-10-15 | Stop reason: HOSPADM

## 2022-10-14 RX ORDER — ACETAMINOPHEN 325 MG/1
650 TABLET ORAL EVERY 4 HOURS PRN
Qty: 100 TABLET | Refills: 0 | Status: SHIPPED | OUTPATIENT
Start: 2022-10-14

## 2022-10-14 RX ORDER — BISACODYL 10 MG
10 SUPPOSITORY, RECTAL RECTAL DAILY PRN
Status: DISCONTINUED | OUTPATIENT
Start: 2022-10-14 | End: 2022-10-15 | Stop reason: HOSPADM

## 2022-10-14 RX ORDER — DEXAMETHASONE SODIUM PHOSPHATE 4 MG/ML
INJECTION, SOLUTION INTRA-ARTICULAR; INTRALESIONAL; INTRAMUSCULAR; INTRAVENOUS; SOFT TISSUE PRN
Status: DISCONTINUED | OUTPATIENT
Start: 2022-10-14 | End: 2022-10-14

## 2022-10-14 RX ORDER — OXYCODONE HYDROCHLORIDE 10 MG/1
10 TABLET ORAL EVERY 4 HOURS PRN
Status: DISCONTINUED | OUTPATIENT
Start: 2022-10-14 | End: 2022-10-15 | Stop reason: HOSPADM

## 2022-10-14 RX ORDER — ONDANSETRON 4 MG/1
4 TABLET, ORALLY DISINTEGRATING ORAL EVERY 30 MIN PRN
Status: DISCONTINUED | OUTPATIENT
Start: 2022-10-14 | End: 2022-10-14 | Stop reason: HOSPADM

## 2022-10-14 RX ORDER — HYDROMORPHONE HCL IN WATER/PF 6 MG/30 ML
0.4 PATIENT CONTROLLED ANALGESIA SYRINGE INTRAVENOUS
Status: DISCONTINUED | OUTPATIENT
Start: 2022-10-14 | End: 2022-10-15 | Stop reason: HOSPADM

## 2022-10-14 RX ORDER — ONDANSETRON 2 MG/ML
4 INJECTION INTRAMUSCULAR; INTRAVENOUS EVERY 6 HOURS PRN
Status: DISCONTINUED | OUTPATIENT
Start: 2022-10-14 | End: 2022-10-15 | Stop reason: HOSPADM

## 2022-10-14 RX ORDER — ASPIRIN 81 MG/1
162 TABLET ORAL 2 TIMES DAILY
Status: DISCONTINUED | OUTPATIENT
Start: 2022-10-14 | End: 2022-10-15 | Stop reason: HOSPADM

## 2022-10-14 RX ORDER — FENTANYL CITRATE 50 UG/ML
25 INJECTION, SOLUTION INTRAMUSCULAR; INTRAVENOUS EVERY 5 MIN PRN
Status: DISCONTINUED | OUTPATIENT
Start: 2022-10-14 | End: 2022-10-14 | Stop reason: HOSPADM

## 2022-10-14 RX ORDER — ONDANSETRON 2 MG/ML
4 INJECTION INTRAMUSCULAR; INTRAVENOUS EVERY 30 MIN PRN
Status: DISCONTINUED | OUTPATIENT
Start: 2022-10-14 | End: 2022-10-14 | Stop reason: HOSPADM

## 2022-10-14 RX ORDER — AMOXICILLIN 250 MG
1 CAPSULE ORAL 2 TIMES DAILY
Status: DISCONTINUED | OUTPATIENT
Start: 2022-10-14 | End: 2022-10-15 | Stop reason: HOSPADM

## 2022-10-14 RX ORDER — HYDRALAZINE HYDROCHLORIDE 20 MG/ML
2.5-5 INJECTION INTRAMUSCULAR; INTRAVENOUS EVERY 10 MIN PRN
Status: DISCONTINUED | OUTPATIENT
Start: 2022-10-14 | End: 2022-10-14 | Stop reason: HOSPADM

## 2022-10-14 RX ORDER — ACETAMINOPHEN 325 MG/1
975 TABLET ORAL EVERY 8 HOURS
Status: DISCONTINUED | OUTPATIENT
Start: 2022-10-14 | End: 2022-10-15 | Stop reason: HOSPADM

## 2022-10-14 RX ORDER — SODIUM CHLORIDE, SODIUM LACTATE, POTASSIUM CHLORIDE, CALCIUM CHLORIDE 600; 310; 30; 20 MG/100ML; MG/100ML; MG/100ML; MG/100ML
INJECTION, SOLUTION INTRAVENOUS CONTINUOUS
Status: DISCONTINUED | OUTPATIENT
Start: 2022-10-14 | End: 2022-10-15 | Stop reason: HOSPADM

## 2022-10-14 RX ORDER — ASPIRIN 81 MG/1
162 TABLET ORAL 2 TIMES DAILY
Qty: 128 TABLET | Refills: 0 | Status: SHIPPED | OUTPATIENT
Start: 2022-10-14

## 2022-10-14 RX ORDER — METOPROLOL TARTRATE 1 MG/ML
1-2 INJECTION, SOLUTION INTRAVENOUS EVERY 5 MIN PRN
Status: DISCONTINUED | OUTPATIENT
Start: 2022-10-14 | End: 2022-10-14 | Stop reason: HOSPADM

## 2022-10-14 RX ORDER — LIDOCAINE 40 MG/G
CREAM TOPICAL
Status: DISCONTINUED | OUTPATIENT
Start: 2022-10-14 | End: 2022-10-15 | Stop reason: HOSPADM

## 2022-10-14 RX ORDER — LIDOCAINE HYDROCHLORIDE 10 MG/ML
INJECTION, SOLUTION INFILTRATION; PERINEURAL PRN
Status: DISCONTINUED | OUTPATIENT
Start: 2022-10-14 | End: 2022-10-14

## 2022-10-14 RX ORDER — POLYETHYLENE GLYCOL 3350 17 G/17G
17 POWDER, FOR SOLUTION ORAL DAILY
Status: DISCONTINUED | OUTPATIENT
Start: 2022-10-15 | End: 2022-10-15 | Stop reason: HOSPADM

## 2022-10-14 RX ORDER — SODIUM CHLORIDE, SODIUM LACTATE, POTASSIUM CHLORIDE, CALCIUM CHLORIDE 600; 310; 30; 20 MG/100ML; MG/100ML; MG/100ML; MG/100ML
INJECTION, SOLUTION INTRAVENOUS CONTINUOUS
Status: DISCONTINUED | OUTPATIENT
Start: 2022-10-14 | End: 2022-10-14 | Stop reason: HOSPADM

## 2022-10-14 RX ORDER — TRANEXAMIC ACID 10 MG/ML
1 INJECTION, SOLUTION INTRAVENOUS ONCE
Status: COMPLETED | OUTPATIENT
Start: 2022-10-14 | End: 2022-10-14

## 2022-10-14 RX ORDER — ACETAMINOPHEN 325 MG/1
650 TABLET ORAL EVERY 4 HOURS PRN
Status: DISCONTINUED | OUTPATIENT
Start: 2022-10-17 | End: 2022-10-15 | Stop reason: HOSPADM

## 2022-10-14 RX ORDER — AMOXICILLIN 250 MG
1-2 CAPSULE ORAL 2 TIMES DAILY
Qty: 30 TABLET | Refills: 0 | Status: SHIPPED | OUTPATIENT
Start: 2022-10-14

## 2022-10-14 RX ORDER — FENTANYL CITRATE 50 UG/ML
INJECTION, SOLUTION INTRAMUSCULAR; INTRAVENOUS PRN
Status: DISCONTINUED | OUTPATIENT
Start: 2022-10-14 | End: 2022-10-14

## 2022-10-14 RX ORDER — IBUPROFEN 600 MG/1
600 TABLET, FILM COATED ORAL EVERY 6 HOURS PRN
Status: DISCONTINUED | OUTPATIENT
Start: 2022-10-14 | End: 2022-10-15 | Stop reason: HOSPADM

## 2022-10-14 RX ORDER — HYDROXYZINE HYDROCHLORIDE 25 MG/1
25 TABLET, FILM COATED ORAL 3 TIMES DAILY PRN
Qty: 40 TABLET | Refills: 0 | Status: SHIPPED | OUTPATIENT
Start: 2022-10-14

## 2022-10-14 RX ORDER — PROPOFOL 10 MG/ML
INJECTION, EMULSION INTRAVENOUS PRN
Status: DISCONTINUED | OUTPATIENT
Start: 2022-10-14 | End: 2022-10-14

## 2022-10-14 RX ORDER — HYDROMORPHONE HCL IN WATER/PF 6 MG/30 ML
0.2 PATIENT CONTROLLED ANALGESIA SYRINGE INTRAVENOUS EVERY 5 MIN PRN
Status: DISCONTINUED | OUTPATIENT
Start: 2022-10-14 | End: 2022-10-14 | Stop reason: HOSPADM

## 2022-10-14 RX ORDER — EPHEDRINE SULFATE 50 MG/ML
INJECTION, SOLUTION INTRAMUSCULAR; INTRAVENOUS; SUBCUTANEOUS PRN
Status: DISCONTINUED | OUTPATIENT
Start: 2022-10-14 | End: 2022-10-14

## 2022-10-14 RX ORDER — OXYCODONE HYDROCHLORIDE 5 MG/1
5 TABLET ORAL EVERY 4 HOURS PRN
Status: DISCONTINUED | OUTPATIENT
Start: 2022-10-14 | End: 2022-10-15 | Stop reason: HOSPADM

## 2022-10-14 RX ORDER — KETOROLAC TROMETHAMINE 15 MG/ML
15 INJECTION, SOLUTION INTRAMUSCULAR; INTRAVENOUS
Status: COMPLETED | OUTPATIENT
Start: 2022-10-14 | End: 2022-10-14

## 2022-10-14 RX ORDER — IBUPROFEN 600 MG/1
600 TABLET, FILM COATED ORAL EVERY 6 HOURS PRN
Qty: 30 TABLET | Refills: 0 | Status: SHIPPED | OUTPATIENT
Start: 2022-10-14

## 2022-10-14 RX ORDER — GLYCOPYRROLATE 0.2 MG/ML
INJECTION, SOLUTION INTRAMUSCULAR; INTRAVENOUS PRN
Status: DISCONTINUED | OUTPATIENT
Start: 2022-10-14 | End: 2022-10-14

## 2022-10-14 RX ORDER — CEFAZOLIN SODIUM/WATER 2 G/20 ML
2 SYRINGE (ML) INTRAVENOUS SEE ADMIN INSTRUCTIONS
Status: DISCONTINUED | OUTPATIENT
Start: 2022-10-14 | End: 2022-10-14 | Stop reason: HOSPADM

## 2022-10-14 RX ORDER — ONDANSETRON 4 MG/1
4 TABLET, ORALLY DISINTEGRATING ORAL EVERY 6 HOURS PRN
Status: DISCONTINUED | OUTPATIENT
Start: 2022-10-14 | End: 2022-10-15 | Stop reason: HOSPADM

## 2022-10-14 RX ORDER — OXYCODONE HYDROCHLORIDE 5 MG/1
5-10 TABLET ORAL EVERY 4 HOURS PRN
Qty: 30 TABLET | Refills: 0 | Status: SHIPPED | OUTPATIENT
Start: 2022-10-14

## 2022-10-14 RX ORDER — TRANEXAMIC ACID 650 MG/1
1950 TABLET ORAL ONCE
Status: DISCONTINUED | OUTPATIENT
Start: 2022-10-14 | End: 2022-10-14

## 2022-10-14 RX ORDER — PROCHLORPERAZINE MALEATE 5 MG
10 TABLET ORAL EVERY 6 HOURS PRN
Status: DISCONTINUED | OUTPATIENT
Start: 2022-10-14 | End: 2022-10-15 | Stop reason: HOSPADM

## 2022-10-14 RX ORDER — ONDANSETRON 2 MG/ML
INJECTION INTRAMUSCULAR; INTRAVENOUS PRN
Status: DISCONTINUED | OUTPATIENT
Start: 2022-10-14 | End: 2022-10-14

## 2022-10-14 RX ORDER — NEOSTIGMINE METHYLSULFATE 1 MG/ML
VIAL (ML) INJECTION PRN
Status: DISCONTINUED | OUTPATIENT
Start: 2022-10-14 | End: 2022-10-14

## 2022-10-14 RX ADMIN — NEOSTIGMINE METHYLSULFATE 3.5 MG: 1 INJECTION, SOLUTION INTRAVENOUS at 12:58

## 2022-10-14 RX ADMIN — Medication 100 MG: at 11:47

## 2022-10-14 RX ADMIN — FENTANYL CITRATE 100 MCG: 50 INJECTION, SOLUTION INTRAMUSCULAR; INTRAVENOUS at 12:07

## 2022-10-14 RX ADMIN — PROPOFOL 50 MG: 10 INJECTION, EMULSION INTRAVENOUS at 13:02

## 2022-10-14 RX ADMIN — SENNOSIDES AND DOCUSATE SODIUM 1 TABLET: 50; 8.6 TABLET ORAL at 19:28

## 2022-10-14 RX ADMIN — Medication 5 MG: at 11:54

## 2022-10-14 RX ADMIN — OXYCODONE HYDROCHLORIDE 5 MG: 5 TABLET ORAL at 13:53

## 2022-10-14 RX ADMIN — HYDROXYZINE HYDROCHLORIDE 25 MG: 25 TABLET, FILM COATED ORAL at 13:53

## 2022-10-14 RX ADMIN — DEXAMETHASONE SODIUM PHOSPHATE 4 MG: 4 INJECTION, SOLUTION INTRA-ARTICULAR; INTRALESIONAL; INTRAMUSCULAR; INTRAVENOUS; SOFT TISSUE at 11:46

## 2022-10-14 RX ADMIN — PROPOFOL 200 MG: 10 INJECTION, EMULSION INTRAVENOUS at 11:46

## 2022-10-14 RX ADMIN — ROCURONIUM BROMIDE 40 MG: 50 INJECTION, SOLUTION INTRAVENOUS at 11:57

## 2022-10-14 RX ADMIN — ACETAMINOPHEN 975 MG: 325 TABLET, FILM COATED ORAL at 19:01

## 2022-10-14 RX ADMIN — SODIUM CHLORIDE, POTASSIUM CHLORIDE, SODIUM LACTATE AND CALCIUM CHLORIDE: 600; 310; 30; 20 INJECTION, SOLUTION INTRAVENOUS at 12:16

## 2022-10-14 RX ADMIN — Medication 2 G: at 11:34

## 2022-10-14 RX ADMIN — HYDROMORPHONE HYDROCHLORIDE 0.4 MG: 0.2 INJECTION, SOLUTION INTRAMUSCULAR; INTRAVENOUS; SUBCUTANEOUS at 23:27

## 2022-10-14 RX ADMIN — SODIUM CHLORIDE, POTASSIUM CHLORIDE, SODIUM LACTATE AND CALCIUM CHLORIDE: 600; 310; 30; 20 INJECTION, SOLUTION INTRAVENOUS at 15:36

## 2022-10-14 RX ADMIN — ONDANSETRON HYDROCHLORIDE 4 MG: 2 INJECTION, SOLUTION INTRAVENOUS at 13:01

## 2022-10-14 RX ADMIN — FENTANYL CITRATE 50 MCG: 50 INJECTION, SOLUTION INTRAMUSCULAR; INTRAVENOUS at 12:01

## 2022-10-14 RX ADMIN — Medication 5 MG: at 12:56

## 2022-10-14 RX ADMIN — FENTANYL CITRATE 100 MCG: 50 INJECTION, SOLUTION INTRAMUSCULAR; INTRAVENOUS at 11:46

## 2022-10-14 RX ADMIN — HYDROMORPHONE HYDROCHLORIDE 0.4 MG: 0.2 INJECTION, SOLUTION INTRAMUSCULAR; INTRAVENOUS; SUBCUTANEOUS at 17:38

## 2022-10-14 RX ADMIN — TRANEXAMIC ACID 1 G: 10 INJECTION, SOLUTION INTRAVENOUS at 11:47

## 2022-10-14 RX ADMIN — PROPOFOL 35 MCG/KG/MIN: 10 INJECTION, EMULSION INTRAVENOUS at 11:54

## 2022-10-14 RX ADMIN — KETOROLAC TROMETHAMINE 15 MG: 15 INJECTION, SOLUTION INTRAMUSCULAR; INTRAVENOUS at 13:51

## 2022-10-14 RX ADMIN — CEFAZOLIN SODIUM 2 G: 2 INJECTION, SOLUTION INTRAVENOUS at 19:01

## 2022-10-14 RX ADMIN — FENTANYL CITRATE 50 MCG: 50 INJECTION, SOLUTION INTRAMUSCULAR; INTRAVENOUS at 12:03

## 2022-10-14 RX ADMIN — LIDOCAINE HYDROCHLORIDE 50 MG: 10 INJECTION, SOLUTION INFILTRATION; PERINEURAL at 11:46

## 2022-10-14 RX ADMIN — ASPIRIN 162 MG: 81 TABLET, COATED ORAL at 19:28

## 2022-10-14 RX ADMIN — SODIUM CHLORIDE, POTASSIUM CHLORIDE, SODIUM LACTATE AND CALCIUM CHLORIDE: 600; 310; 30; 20 INJECTION, SOLUTION INTRAVENOUS at 10:17

## 2022-10-14 RX ADMIN — GLYCOPYRROLATE 0.8 MCG: 0.2 INJECTION, SOLUTION INTRAMUSCULAR; INTRAVENOUS at 12:58

## 2022-10-14 RX ADMIN — HYDROMORPHONE HYDROCHLORIDE 0.2 MG: 0.2 INJECTION, SOLUTION INTRAMUSCULAR; INTRAVENOUS; SUBCUTANEOUS at 15:30

## 2022-10-14 RX ADMIN — ACETAMINOPHEN 975 MG: 325 TABLET, FILM COATED ORAL at 10:25

## 2022-10-14 RX ADMIN — HYDROMORPHONE HYDROCHLORIDE 0.4 MG: 0.2 INJECTION, SOLUTION INTRAMUSCULAR; INTRAVENOUS; SUBCUTANEOUS at 21:57

## 2022-10-14 RX ADMIN — Medication 5 MG: at 12:47

## 2022-10-14 RX ADMIN — OXYCODONE HYDROCHLORIDE 10 MG: 10 TABLET ORAL at 19:28

## 2022-10-14 RX ADMIN — MIDAZOLAM 2 MG: 1 INJECTION INTRAMUSCULAR; INTRAVENOUS at 11:38

## 2022-10-14 RX ADMIN — GLYCOPYRROLATE 0.2 MCG: 0.2 INJECTION, SOLUTION INTRAMUSCULAR; INTRAVENOUS at 11:46

## 2022-10-14 ASSESSMENT — ACTIVITIES OF DAILY LIVING (ADL)
ADLS_ACUITY_SCORE: 19

## 2022-10-14 NOTE — ANESTHESIA POSTPROCEDURE EVALUATION
Patient: Lavell Mercer    Procedure: Procedure(s):  Left knee total arthroplasty       Anesthesia Type:  Spinal    Note:     Postop Pain Control: Uneventful            Sign Out: Well controlled pain   PONV: No   Neuro/Psych: Uneventful            Sign Out: Acceptable/Baseline neuro status   Airway/Respiratory: Uneventful            Sign Out: Acceptable/Baseline resp. status   CV/Hemodynamics: Uneventful            Sign Out: Acceptable CV status; No obvious hypovolemia; No obvious fluid overload   Other NRE: NONE   DID A NON-ROUTINE EVENT OCCUR? No           Last vitals:  Vitals Value Taken Time   /79 10/14/22 1403   Temp 97.3  F (36.3  C) 10/14/22 1358   Pulse 68 10/14/22 1417   Resp 26 10/14/22 1417   SpO2 96 % 10/14/22 1418   Vitals shown include unvalidated device data.    Electronically Signed By: Fabricio Marie MD  October 14, 2022  3:46 PM

## 2022-10-14 NOTE — ANESTHESIA PREPROCEDURE EVALUATION
Anesthesia Pre-Procedure Evaluation    Patient: Lavell Mercer   MRN: [id number] : 1965        Procedure : Procedure(s):  Left knee total arthroplasty          Past Medical History:   Diagnosis Date     Coronary artery disease      Heart attack (H)      Sleep apnea     CPAP     Stented coronary artery       Past Surgical History:   Procedure Laterality Date     JOINT REPLACEMENT Right     right knee      No Known Allergies   Social History     Tobacco Use     Smoking status: Former     Types: Cigarettes     Quit date: 1998     Years since quittin.8     Smokeless tobacco: Never   Substance Use Topics     Alcohol use: Yes      Wt Readings from Last 1 Encounters:   10/14/22 115.7 kg (255 lb)        Anesthesia Evaluation   Pt has had prior anesthetic. Type: General.    History of anesthetic complications  - PONV.      ROS/MED HX  ENT/Pulmonary:     (+) sleep apnea, uses CPAP,     Neurologic:  - neg neurologic ROS     Cardiovascular: Comment: Coronary artery disease involving native coronary artery of native heart without angina pectoris (HRC) 2018 - NSTEMI with HONEY to pOM1. Unsuccessful attempt to open RCA and inferior OM1       (+) Dyslipidemia --CAD -past MI -stent-    METS/Exercise Tolerance:     Hematologic: Comments: Lab Test        02/13/18     02/12/18     02/10/18     02/10/18                       0533          0523          1823          1325          WBC          5.9          6.3          8.0          6.4           HGB          14.9         15.1         14.9         16.8          MCV          88           89           87           87            PLT          190          179          218          222           INR           --           --           --          1.01           Lab Test        02/13/18     02/12/18     02/10/18                       0533          0523          1325          NA           139          141          138           POTASSIUM    4.0          4.0           3.9           CHLORIDE     107          110*         107           CO2          25           25           25            BUN          9            12           13            CR           0.82         0.80         0.72          ANIONGAP     7            6            6             BRAN          8.5          8.5          9.2           GLC          97           102*         103*                Musculoskeletal: Comment: Degenerative arthritis of left knee       Acute medial meniscus tear of left knee      GI/Hepatic:  - neg GI/hepatic ROS     Renal/Genitourinary:  - neg Renal ROS     Endo:  - neg endo ROS     Psychiatric/Substance Use:  - neg psychiatric ROS     Infectious Disease:  - neg infectious disease ROS     Malignancy:  - neg malignancy ROS     Other:  - neg other ROS          Physical Exam    Airway        Mallampati: II   TM distance: > 3 FB   Neck ROM: full   Mouth opening: > 3 cm    Respiratory Devices and Support         Dental  no notable dental history         Cardiovascular   cardiovascular exam normal          Pulmonary   pulmonary exam normal                OUTSIDE LABS:  CBC:   Lab Results   Component Value Date    WBC 5.9 02/13/2018    WBC 6.3 02/12/2018    HGB 14.9 02/13/2018    HGB 15.1 02/12/2018    HCT 42.7 02/13/2018    HCT 43.0 02/12/2018     02/13/2018     02/12/2018     BMP:   Lab Results   Component Value Date     02/13/2018     02/12/2018    POTASSIUM 4.0 02/13/2018    POTASSIUM 4.0 02/12/2018    CHLORIDE 107 02/13/2018    CHLORIDE 110 (H) 02/12/2018    CO2 25 02/13/2018    CO2 25 02/12/2018    BUN 9 02/13/2018    BUN 12 02/12/2018    CR 0.82 02/13/2018    CR 0.80 02/12/2018    GLC 97 02/13/2018     (H) 02/12/2018     COAGS:   Lab Results   Component Value Date    INR 1.01 02/10/2018     POC: No results found for: BGM, HCG, HCGS  HEPATIC:   Lab Results   Component Value Date    ALBUMIN 4.0 02/10/2018    PROTTOTAL 8.0 02/10/2018    ALT 38 05/14/2018    AST 37  02/10/2018    ALKPHOS 70 02/10/2018    BILITOTAL 0.9 02/10/2018     OTHER:   Lab Results   Component Value Date    A1C 5.1 02/11/2018    BRAN 8.5 02/13/2018    LIPASE 141 02/10/2018       Anesthesia Plan    ASA Status:  3      Anesthesia Type: General.     - Airway: LMA   Induction: Propofol, Intravenous.   Maintenance: Balanced.        Consents    Anesthesia Plan(s) and associated risks, benefits, and realistic alternatives discussed. Questions answered and patient/representative(s) expressed understanding.    - Discussed:     - Discussed with:  Patient      - Extended Intubation/Ventilatory Support Discussed: No.      - Patient is DNR/DNI Status: No    Use of blood products discussed: Yes.     - Discussed with: Patient.     - Consented: consented to blood products            Reason for refusal: other.     Postoperative Care    Pain management: IV analgesics.   PONV prophylaxis: Ondansetron (or other 5HT-3), Dexamethasone or Solumedrol     Comments:                Fabricio Marie MD

## 2022-10-14 NOTE — PROGRESS NOTES
10/14/22 1600   Appointment Info   Signing Clinician's Name / Credentials (PT) Yvette Young DPT   Living Environment   People in Home spouse;child(giacomo), adult;other (see comments)  (inlaws)   Current Living Arrangements house   Home Accessibility stairs to enter home;stairs within home   Number of Stairs, Main Entrance 2  (5 with railing from garage)   Stair Railings, Main Entrance none   Number of Stairs, Within Home, Primary five   Stair Railings, Within Home, Primary railings safe and in good condition  (1 side)   Transportation Anticipated family or friend will provide   Living Environment Comments Pt lives in split entry house; lives downstairs but kitchen upstairs. Tub shower downstairs, walk in availab le upstairs.   Self-Care   Usual Activity Tolerance good   Current Activity Tolerance moderate   Regular Exercise No   Equipment Currently Used at Home none   Fall history within last six months no   Activity/Exercise/Self-Care Comment Pt IND at baseline. Has walker from last knee replacement.   General Information   Onset of Illness/Injury or Date of Surgery 10/14/22   Referring Physician Dick Robbins PA-C   Patient/Family Therapy Goals Statement (PT) return home   Pertinent History of Current Problem (include personal factors and/or comorbidities that impact the POC) Pt is 56 yo male who underwent L TKA on 10/14/2022.   Existing Precautions/Restrictions fall;weight bearing   Weight-Bearing Status - LLE weight-bearing as tolerated   Cognition   Affect/Mental Status (Cognition) WNL   Orientation Status (Cognition) oriented x 4   Follows Commands (Cognition) WNL   Pain Assessment   Patient Currently in Pain Yes, see Vital Sign flowsheet   Integumentary/Edema   Integumentary/Edema Comments L knee bandage intact   Posture    Posture Not impaired   Range of Motion (ROM)   ROM Comment L knee flex to ~90 degrees   Strength (Manual Muscle Testing)   Strength Comments able to SLR bilaterally, RLE WNL, mild  weakness with LLE with functional mobility   Bed Mobility   Comment, (Bed Mobility) supine<>sit with SBA   Transfers   Comment, (Transfers) sit<>stand with FWW and CGA   Gait/Stairs (Locomotion)   Comment, (Gait/Stairs) amb with FWW and CGA, slow gait, steady, decreased step length   Balance   Balance Comments impaired; requiring use of walker   Sensory Examination   Sensory Perception Comments reports general pressure sensation in LLE; otherwise WFL   Clinical Impression   Criteria for Skilled Therapeutic Intervention Yes, treatment indicated   PT Diagnosis (PT) impaired functional mobility   Influenced by the following impairments weakness, pain, post op statsu   Functional limitations due to impairments difficulty with transfers, ambulation, stairs   Clinical Presentation (PT Evaluation Complexity) Stable/Uncomplicated   Clinical Presentation Rationale clinical judgement, [facility]   Clinical Decision Making (Complexity) low complexity   Planned Therapy Interventions (PT) balance training;bed mobility training;gait training;home exercise program;neuromuscular re-education;patient/family education;ROM (range of motion);stair training;strengthening;transfer training;progressive activity/exercise;risk factor education;home program guidelines   Anticipated Equipment Needs at Discharge (PT)   (pt owns FWW)   Risk & Benefits of therapy have been explained evaluation/treatment results reviewed;care plan/treatment goals reviewed;risks/benefits reviewed;current/potential barriers reviewed;participants voiced agreement with care plan;participants included;patient   PT Total Evaluation Time   PT Eval, Low Complexity Minutes (48993) 10   Plan of Care Review   Plan of Care Reviewed With patient   Physical Therapy Goals   PT Frequency Daily   PT Predicted Duration/Target Date for Goal Attainment 10/15/22   PT Goals Bed Mobility;Transfers;Gait;Stairs   PT: Bed Mobility Independent;Supine to/from sit   PT: Transfers Modified  independent;Sit to/from stand;Assistive device   PT: Gait Modified independent;Standard walker;Greater than 200 feet   PT: Stairs Supervision/stand-by assist;5 stairs;Assistive device;Rail on right   Total Session Time   Total Session Time (sum of timed and untimed services) 10

## 2022-10-14 NOTE — ANESTHESIA CARE TRANSFER NOTE
Patient: Lavell Mercer    Procedure: Procedure(s):  Left knee total arthroplasty       Diagnosis: Degenerative arthritis of left knee [M17.12]  Acute medial meniscus tear of left knee [S83.242A]  Diagnosis Additional Information: No value filed.    Anesthesia Type:   Spinal     Note:    Oropharynx: oropharynx clear of all foreign objects and spontaneously breathing  Level of Consciousness: drowsy  Oxygen Supplementation: face mask  Level of Supplemental Oxygen (L/min / FiO2): 8  Independent Airway: airway patency satisfactory and stable  Dentition: dentition unchanged  Vital Signs Stable: post-procedure vital signs reviewed and stable  Report to RN Given: handoff report given  Patient transferred to: PACU    Handoff Report: Identifed the Patient, Identified the Reponsible Provider, Reviewed the pertinent medical history, Discussed the surgical course, Reviewed Intra-OP anesthesia mangement and issues during anesthesia, Set expectations for post-procedure period and Allowed opportunity for questions and acknowledgement of understanding      Vitals:  Vitals Value Taken Time   /65 10/14/22 1311   Temp     Pulse 82 10/14/22 1315   Resp 18 10/14/22 1315   SpO2 99 % 10/14/22 1315   Vitals shown include unvalidated device data.    Electronically Signed By: HEATH Redmond CRNA  October 14, 2022  1:16 PM

## 2022-10-14 NOTE — BRIEF OP NOTE
[facility]    Brief Operative Note    Pre-operative diagnosis: Degenerative arthritis of left knee [M17.12]  Acute medial meniscus tear of left knee [S83.242A]  Post-operative diagnosis Same as pre-operative diagnosis    Procedure: Procedure(s):  Left knee total arthroplasty  Surgeon: Surgeon(s) and Role:     * Jax Haynes MD - Primary     * Dick Robbins PA-C - Assisting  Anesthesia: General with Block   Estimated Blood Loss: 10 mL from 10/14/2022 11:38 AM to 10/14/2022  1:10 PM      Drains: None  Specimens: * No specimens in log *  Findings:   None.  Complications: None   .  Implants:   Implant Name Type Inv. Item Serial No.  Lot No. LRB No. Used Action   BONE CEMENT STRK SIMPLEX P SPEEDSET 6192-1-001 - [id number] Cement, Bone BONE CEMENT STRK SIMPLEX P SPEEDSET 6192-1-001  [employer] [id number] Left 1 Implanted   IBAL TKA FEM IMP CRCEMENTED SZ 7LFT - [id number] Total Joint Component/Insert IBAL TKA FEM IMP CRCEMENTED SZ 7LFT  [employer] [id number] Left 1 Implanted   IMP COMP TKA IBALANCE MOD TIBIAL TRAY SZ 6 AR-513-T6 - [id number] Total Joint Component/Insert IMP COMP TKA IBALANCE MOD TIBIAL TRAY SZ 6 AR-513-T6  [employer] [id number] Left 1 Implanted   PATELLA IMP DOME 30 X 8MM VIT-E - [id number] Total Joint Component/Insert PATELLA IMP DOME 30 X 8MM VIT-E  [employer] [id number] Left 1 Implanted   iBalance TKA, tibial bearing implant, CR Plus, VIT E, Size 6 (10mm thickness) Total Joint Component/Insert   [employer] [id number] Left 1 Implanted

## 2022-10-14 NOTE — ANESTHESIA PROCEDURE NOTES
Femoral Procedure Note    Pre-Procedure   Staff -        Anesthesiologist:  Fabricio Marie MD       Performed By: anesthesiologist       Location: pre-op       Procedure Start/Stop Times: 10/14/2022 10:29 AM and 10/14/2022 10:45 AM       Pre-Anesthestic Checklist: patient identified, IV checked, site marked, risks and benefits discussed, informed consent, monitors and equipment checked, pre-op evaluation, at physician/surgeon's request and post-op pain management  Timeout:       Correct Patient: Yes        Correct Procedure: Yes        Correct Site: Yes        Correct Position: Yes        Correct Laterality: Yes        Site Marked: Yes  Procedure Documentation  Procedure: Femoral       Laterality: left       Patient Position: supine       Patient Prep/Sterile Barriers: sterile gloves, mask       Skin prep: Betadine       Needle Type: insulated and short bevel       Needle Gauge: 22.        Needle Length (Inches): 2        Ultrasound guided       1. Ultrasound was used to identify targeted nerve, plexus, vascular marker, or fascial plane and place a needle adjacent to it in real-time.       2. Ultrasound was used to visualize the spread of anesthetic in close proximity to the above referenced structure.    Assessment/Narrative         The placement was negative for: blood aspirated, painful injection and site bleeding       Paresthesias: No.       Bolus given via needle. no blood aspirated via catheter.        Secured via.        Insertion/Infusion Method: Single Shot       Complications: none    Medication(s) Administered   Medication Administration Time: 10/14/2022 10:29 AM     Comments:  22ml of 0.5% Bupivicaine w/ 1:200,000 epi  injected    The surgeon has given a verbal order transferring care of this patient to me for the performance of a regional analgesia block for post-op pain control. It is requested of me because I am uniquely trained and qualified to perform this block and the surgeon is neither trained  "nor qualified to perform this procedure.          FOR [facility] ([facility]) ONLY:   Pain Team Contact information: please page the Pain Team Via [facility]. Search \"Pain\". During daytime hours, please page the attending first. At night please page the resident first.    "

## 2022-10-14 NOTE — ANESTHESIA PROCEDURE NOTES
Airway       Patient location during procedure: OR       Procedure Start/Stop Times: 10/14/2022 11:46 AM  Staff -        CRNA: Tejinder Pichardo APRN CRNA       Performed By: CRNA  Consent for Airway        Urgency: elective  Indications and Patient Condition       Indications for airway management: lana-procedural       Induction type:intravenous       Mask difficulty assessment: 1 - vent by mask    Final Airway Details       Final airway type: endotracheal airway       Successful airway: ETT - single and Oral  Endotracheal Airway Details        ETT size (mm): 8.0       Cuffed: yes       Cuff volume (mL): 6       Successful intubation technique: direct laryngoscopy       DL Blade Type: Burt 2       Grade View of Cords: 1       Adjucts: stylet       Position: Right       Measured from: lips       Secured at (cm): 26       Bite block used: Soft    Post intubation assessment        Placement verified by: capnometry, equal breath sounds and chest rise        Number of attempts at approach: 1       Number of other approaches attempted: 0       Secured with: plastic tape       Ease of procedure: easy       Dentition: Intact and Unchanged    Medication(s) Administered   Medication Administration Time: 10/14/2022 11:46 AM    Additional Comments       Tried LMA first, couldn't ventilate with LMA, switched to ETT.

## 2022-10-14 NOTE — OP NOTE
Procedure Date: 10/14/2022    PREOPERATIVE DIAGNOSIS:  Left knee end-stage tricompartmental osteoarthritis.    POSTOPERATIVE DIAGNOSIS:  Left knee end-stage tricompartmental osteoarthritis.    PROCEDURE:  Left total knee arthroplasty.    SURGEON:  Jax Haynes MD    ASSISTANT:  Dick Robbins PA-C.    ANESTHESIA:  General with regional.    ESTIMATED BLOOD LOSS:  25 mL    INTRAOPERATIVE COMPLICATIONS:  None apparent.    OPERATIVE INDICATIONS:  The patient has a long history of progressive left knee pain with end-stage arthritic change seen on imaging.  Given his lack of response to conservative management, ongoing pain impacting his functional daily activities and overall quality of life, he elected to proceed with left total knee arthroplasty.    DESCRIPTION OF PROCEDURE:  The patient was identified in the preoperative holding area and the operative site was marked.  Consent was again reviewed and all questions were answered.  He was taken to the operating room and placed under general anesthesia and positioned supine on the operating table.  Left lower extremity prepped and draped in the usual sterile fashion.  Preoperative antibiotics administered.  A timeout called to ensure the correct operative site, and procedure and the tourniquet inflated to 250 mmHg.  Midline longitudinal incision was made about the knee with sharp dissection carried down through skin and subcutaneous tissues.  Further dissection was carried down through a medial parapatellar arthrotomy.  A moderate medial release was performed.  The intramedullary femoral guide was placed.  All were placed and the distal femoral cut block.  The distal femoral cut was then made.  The femur was sized to an 7 with a 3-degree external rotation guide referencing the posterior femoral condyle.  The 4-in-1 cut block size 7 was then pinned in place and anterior, posterior and chamfer cuts were completed.  The extramedullary tibial guide was placed and the tibial cut  completed in appropriate alignment.  The PCL was protected.  Remnant and posterior osteophytes and menisci were excised.  Trial implants were placed and the patient came in appropriate balanced motion, achieving full extension with the 10 mm thickness polyethylene.  The bone bed was repaired and after drilling lug holes for the femur and accounting for appropriate rotation with final preparation of the tibia and after performing a cut at the patella and drilling lug holes, the final implants were opened.  Bone beds were prepared and the size 6 iBalance tibial tray was cemented in place, followed by placement of the size 7 left iBalance TKA femoral CR implant.  The 10 mm thickness iBalance tibial bearing implant CR plus with vitamin E was placed.  The size 30 iBalance patellar implant was also placed.  All excess cement was carefully removed.  The knee was held in extension during cement polymerization.  There was excellent balance.  Ligamentous exam with normal patellar tracking and achievement of full extension.  The wound was then soaked in dilute Betadine solution, followed by irrigation with pulsatile lavage.  A pericapsular anesthetic cocktail was infiltrated throughout the joint.  A gram of vancomycin powder was placed deep in the wound.  The arthrotomy was closed with interrupted #1 Vicryl suture, followed by layered wound closure.  Sterile dressings were applied.  The patient transferred to the postoperative stable condition.    Dick Robbins PA-C was present and scrubbed throughout the case and his assistance was critical for patient positioning, assistance with prepping, draping, soft, retraction, leg manipulation, wound closure and dressing application.    Jax Haynes MD        D: 10/14/2022   T: 10/14/2022   MT: [id number]    Name:     LOUIE VEGAS  MRN:      [id number]        Account:        [id number]   :      1965           Procedure Date: 10/14/2022     Document: [id number]

## 2022-10-15 ENCOUNTER — APPOINTMENT (OUTPATIENT)
Dept: PHYSICAL THERAPY | Facility: CLINIC | Age: 57
End: 2022-10-15
Attending: ORTHOPAEDIC SURGERY
Payer: COMMERCIAL

## 2022-10-15 VITALS
BODY MASS INDEX: 37.77 KG/M2 | TEMPERATURE: 95.4 F | WEIGHT: 255 LBS | HEIGHT: 69 IN | DIASTOLIC BLOOD PRESSURE: 81 MMHG | RESPIRATION RATE: 18 BRPM | HEART RATE: 78 BPM | SYSTOLIC BLOOD PRESSURE: 129 MMHG | OXYGEN SATURATION: 94 %

## 2022-10-15 LAB
GLUCOSE BLDC GLUCOMTR-MCNC: 143 MG/DL (ref 70–99)
HGB BLD-MCNC: 13.3 G/DL (ref 13.3–17.7)

## 2022-10-15 PROCEDURE — 97116 GAIT TRAINING THERAPY: CPT | Mod: GP

## 2022-10-15 PROCEDURE — 85018 HEMOGLOBIN: CPT | Performed by: PHYSICIAN ASSISTANT

## 2022-10-15 PROCEDURE — 258N000003 HC RX IP 258 OP 636: Performed by: PHYSICIAN ASSISTANT

## 2022-10-15 PROCEDURE — 97530 THERAPEUTIC ACTIVITIES: CPT | Mod: GP

## 2022-10-15 PROCEDURE — 82962 GLUCOSE BLOOD TEST: CPT

## 2022-10-15 PROCEDURE — 97110 THERAPEUTIC EXERCISES: CPT | Mod: GP

## 2022-10-15 PROCEDURE — 250N000013 HC RX MED GY IP 250 OP 250 PS 637: Performed by: PHYSICIAN ASSISTANT

## 2022-10-15 PROCEDURE — 36415 COLL VENOUS BLD VENIPUNCTURE: CPT | Performed by: PHYSICIAN ASSISTANT

## 2022-10-15 PROCEDURE — 250N000011 HC RX IP 250 OP 636: Performed by: PHYSICIAN ASSISTANT

## 2022-10-15 RX ADMIN — OXYCODONE HYDROCHLORIDE 10 MG: 10 TABLET ORAL at 08:33

## 2022-10-15 RX ADMIN — SENNOSIDES AND DOCUSATE SODIUM 1 TABLET: 50; 8.6 TABLET ORAL at 08:35

## 2022-10-15 RX ADMIN — OXYCODONE HYDROCHLORIDE 10 MG: 10 TABLET ORAL at 00:36

## 2022-10-15 RX ADMIN — CEFAZOLIN SODIUM 2 G: 2 INJECTION, SOLUTION INTRAVENOUS at 02:56

## 2022-10-15 RX ADMIN — ASPIRIN 162 MG: 81 TABLET, COATED ORAL at 08:35

## 2022-10-15 RX ADMIN — ACETAMINOPHEN 975 MG: 325 TABLET, FILM COATED ORAL at 02:55

## 2022-10-15 RX ADMIN — POLYETHYLENE GLYCOL 3350 17 G: 17 POWDER, FOR SOLUTION ORAL at 08:36

## 2022-10-15 RX ADMIN — HYDROMORPHONE HYDROCHLORIDE 0.4 MG: 0.2 INJECTION, SOLUTION INTRAMUSCULAR; INTRAVENOUS; SUBCUTANEOUS at 06:55

## 2022-10-15 RX ADMIN — ACETAMINOPHEN 975 MG: 325 TABLET, FILM COATED ORAL at 10:28

## 2022-10-15 RX ADMIN — SODIUM CHLORIDE, POTASSIUM CHLORIDE, SODIUM LACTATE AND CALCIUM CHLORIDE: 600; 310; 30; 20 INJECTION, SOLUTION INTRAVENOUS at 01:23

## 2022-10-15 RX ADMIN — HYDROMORPHONE HYDROCHLORIDE 0.4 MG: 0.2 INJECTION, SOLUTION INTRAMUSCULAR; INTRAVENOUS; SUBCUTANEOUS at 03:02

## 2022-10-15 ASSESSMENT — ACTIVITIES OF DAILY LIVING (ADL)
ADLS_ACUITY_SCORE: 21
ADLS_ACUITY_SCORE: 19

## 2022-10-15 NOTE — PLAN OF CARE
Goal Outcome Evaluation:                      Patient vital signs are at baseline: Yes  Patient able to ambulate as they were prior to admission or with assist devices provided by therapies during their stay:  No,  Reason:  1A WGB  Patient MUST void prior to discharge:  Yes  Patient able to tolerate oral intake:  Yes  Pain has adequate pain control using Oral analgesics:  Yes - pt stated his intent is to keep ahead of the pain while he is here; Oxy and dilaudid   Does patient have an identified :  Yes, wife  Has goal D/C date and time been discussed with patient:  Yes, 10/15 afternoon

## 2022-10-15 NOTE — PLAN OF CARE
Physical Therapy Discharge Summary    Reason for therapy discharge:    All goals and outcomes met, no further needs identified.    Progress towards therapy goal(s). See goals on Care Plan in [facility] electronic health record for goal details.  Goals met    Therapy recommendation(s):    Defer to ortho.

## 2022-10-15 NOTE — PLAN OF CARE
Goal Outcome Evaluation:      Plan of Care Reviewed With: patient      Pt A&O x 4. VSS. Assist x 1 with gait belt and walker. Tolerating reg diet. Voiding spontaneously. On Room air. LS clear. BS +. C/o L Knee pain. PRN oxycodone and scheduled Tylenol given

## 2022-10-15 NOTE — PROGRESS NOTES
Patient's After Visit Summary was reviewed with patient and spouse.   Patient verbalized understanding of After Visit Summary, and was given an opportunity to ask questions.   Discharge medications sent home with patient/family: YES, No   Discharged with spouse    APt discharged at 1135 with alll personal belongings, paperwork and prescriptions.

## 2022-10-15 NOTE — PLAN OF CARE
Goal Outcome Evaluation:             Patient vital signs are at baseline: Yes  Patient able to ambulate as they were prior to admission or with assist devices provided by therapies during their stay:  Yes, pt walks with walker and gate belt. A-1.  Patient MUST void prior to discharge:  Yes  Patient able to tolerate oral intake:  Yes  Pain has adequate pain control using Oral analgesics:  Yes  Does patient have an identified :  Yes  Has goal D/C date and time been discussed with patient:  Yes, pt will discharge today.      Pt is A&Ox4.  C/o pain 7-8/10, PRN Oxycodone and Dilaudid was given. Ice applied. Aquacell  dressing intact, scan amount of bloody drainge noted. +2 edema to LLE. PPP.

## 2022-10-15 NOTE — PLAN OF CARE
Occupational Therapy: Orders received. Chart reviewed and discussed with care team.? Occupational Therapy not indicated due to Pt with no OT needs for d/c.? Defer discharge recommendations to Physial Therapy.? Will complete orders.

## 2022-10-15 NOTE — PROGRESS NOTES
Orthopedic Surgery  Lavell Mercer  10/15/2022  Admit Date:  10/14/2022  POD # 1 s/p left TKA    Lavell Mercer is a 58 y/o male whom was rounded on 1 day s/p left tka.  Pain controlled.  Tolerating oral intake.  No events overnight. The patient denies chest pain, SOB, calf pain.    Alert and orient to person, place, and time.  Vital Sign Ranges  Temperature Temp  Av.8  F (36.6  C)  Min: 97.2  F (36.2  C)  Max: 99.4  F (37.4  C)   Blood pressure Systolic (24hrs), Av , Min:108 , Max:140        Diastolic (24hrs), Av, Min:65, Max:88      Pulse Pulse  Av.9  Min: 48  Max: 97   Respirations Resp  Av.4  Min: 8  Max: 22   Pulse oximetry SpO2  Av.5 %  Min: 90 %  Max: 99 %       Left knee dressing is clean, dry, and intact. Minimal erythema of the surrounding skin and no signs of infection  Bilateral calves are soft, non-tender.  left lower extremity is NVI. 2+ DP/PT pulses   Able to actively move distal extremity  5/5 strength distally    Labs:  Recent Labs   Lab Test 18  0533 18  0523 02/10/18  1325   POTASSIUM 4.0 4.0 3.9     Recent Labs   Lab Test 10/15/22  0628 18  0533 18  0523   HGB 13.3 14.9 15.1     Recent Labs   Lab Test 02/10/18  1325   INR 1.01     Recent Labs   Lab Test 18  0533 18  0523 02/10/18  1823    179 218       A/P  1. POD #1 s/p left TKA   Continue ASA for DVT prophylaxis.     Mobilize with PT/OT WBAT.     Continue current pain regiment.   F/U in 2 weeks with Dick Robbins PA-C    2. Disposition   Ok to discharge home today.    Dick Robbins PA-C  [phone]

## 2022-10-17 ENCOUNTER — TRANSCRIBE ORDERS (OUTPATIENT)
Dept: OTHER | Age: 57
End: 2022-10-17

## 2022-10-17 ENCOUNTER — THERAPY VISIT (OUTPATIENT)
Dept: PHYSICAL THERAPY | Facility: CLINIC | Age: 57
End: 2022-10-17
Payer: COMMERCIAL

## 2022-10-17 DIAGNOSIS — Z47.1 AFTERCARE FOLLOWING LEFT KNEE JOINT REPLACEMENT SURGERY: ICD-10-CM

## 2022-10-17 DIAGNOSIS — Z47.89 AFTERCARE FOLLOWING SURGERY OF THE MUSCULOSKELETAL SYSTEM: ICD-10-CM

## 2022-10-17 DIAGNOSIS — M25.562 ACUTE PAIN OF LEFT KNEE: ICD-10-CM

## 2022-10-17 DIAGNOSIS — Z96.652 AFTERCARE FOLLOWING LEFT KNEE JOINT REPLACEMENT SURGERY: ICD-10-CM

## 2022-10-17 PROCEDURE — 97110 THERAPEUTIC EXERCISES: CPT | Mod: GP | Performed by: PHYSICAL THERAPIST

## 2022-10-17 PROCEDURE — 97161 PT EVAL LOW COMPLEX 20 MIN: CPT | Mod: GP | Performed by: PHYSICAL THERAPIST

## 2022-10-17 ASSESSMENT — ACTIVITIES OF DAILY LIVING (ADL)
SWELLING: THE SYMPTOM AFFECTS MY ACTIVITY SEVERELY
KNEE_ACTIVITY_OF_DAILY_LIVING_SUM: 12
HOW_WOULD_YOU_RATE_THE_OVERALL_FUNCTION_OF_YOUR_KNEE_DURING_YOUR_USUAL_DAILY_ACTIVITIES?: SEVERELY ABNORMAL
AS_A_RESULT_OF_YOUR_KNEE_INJURY,_HOW_WOULD_YOU_RATE_YOUR_CURRENT_LEVEL_OF_DAILY_ACTIVITY?: SEVERELY ABNORMAL
SQUAT: I AM UNABLE TO DO THE ACTIVITY
STAND: ACTIVITY IS VERY DIFFICULT
PAIN: THE SYMPTOM AFFECTS MY ACTIVITY SEVERELY
LIMPING: THE SYMPTOM AFFECTS MY ACTIVITY SEVERELY
WEAKNESS: THE SYMPTOM AFFECTS MY ACTIVITY SEVERELY
SIT WITH YOUR KNEE BENT: ACTIVITY IS VERY DIFFICULT
KNEE_ACTIVITY_OF_DAILY_LIVING_SCORE: 17.14
GO UP STAIRS: ACTIVITY IS VERY DIFFICULT
KNEEL ON THE FRONT OF YOUR KNEE: I AM UNABLE TO DO THE ACTIVITY
GIVING WAY, BUCKLING OR SHIFTING OF KNEE: THE SYMPTOM AFFECTS MY ACTIVITY SEVERELY
HOW_WOULD_YOU_RATE_THE_CURRENT_FUNCTION_OF_YOUR_KNEE_DURING_YOUR_USUAL_DAILY_ACTIVITIES_ON_A_SCALE_FROM_0_TO_100_WITH_100_BEING_YOUR_LEVEL_OF_KNEE_FUNCTION_PRIOR_TO_YOUR_INJURY_AND_0_BEING_THE_INABILITY_TO_PERFORM_ANY_OF_YOUR_USUAL_DAILY_ACTIVITIES?: 1
STIFFNESS: THE SYMPTOM AFFECTS MY ACTIVITY SEVERELY
RAW_SCORE: 12
RISE FROM A CHAIR: ACTIVITY IS VERY DIFFICULT
GO DOWN STAIRS: ACTIVITY IS VERY DIFFICULT
WALK: ACTIVITY IS VERY DIFFICULT

## 2022-10-17 NOTE — PROGRESS NOTES
Physical Therapy Initial Evaluation  Subjective:  The history is provided by the patient and the spouse. No  was used.   Patient Health History  Lavell Mercer being seen for left knee PT.     Problem began: 8/17/2022.   Problem occurred: Golfing   Pain is reported as 6/10 on pain scale.  General health as reported by patient is good.  Pertinent medical history includes: overweight and sleep disorder/apnea.     Medical allergies: none.   Surgeries include:  Orthopedic surgery, heart surgery and other. Other surgery history details: gall bladder, appendix,.    Current medications:  None.    Current occupation is Self employed, [employer].   Primary job tasks include:  Computer work, driving and prolonged sitting.                  Therapist Generated HPI Evaluation  Problem details: Pt. complains of (left knee pain that has been present since L TKA on 10/14/22.    .         Type of problem:  Left knee.    This is a new condition.      Patient reports pain:  Anterior.  Pain is described as aching and is constant.  Pain radiates to:  Knee.   Since onset symptoms are gradually improving.  Associated symptoms:  Loss of strength and loss of motion/stiffness. Symptoms are exacerbated by walking, weight bearing, ascending stairs, bending/squatting and kneeling  and relieved by ice and rest.    Previous treatment includes surgery. There was mild improvement following previous treatment.  Restrictions due to condition include:  Currently not working due to present treatment.  Barriers include:  None as reported by patient.                        Objective:    Gait:    Gait Type:  Antalgic   Weight Bearing Status:  WBAT   Assistive Devices:  None                                                        Knee Evaluation:  ROM:      PROM    Hyperextension: Left: 0   Right:   Extension: Left: 3   Right:   Flexion: Left: 85   Right:             Palpation:    Left knee tenderness present at:   Incisional              General     ROS    Assessment/Plan:    Patient is a 57 year old male with left side knee complaints.    Patient has the following significant findings with corresponding treatment plan.                Diagnosis 1:  S/p L TKA  Pain -  hot/cold therapy, self management, education, directional preference exercise and home program  Decreased ROM/flexibility - manual therapy and therapeutic exercise  Decreased strength - therapeutic exercise and therapeutic activities  Decreased proprioception - neuro re-education and therapeutic activities  Impaired gait - gait training  Impaired muscle performance - neuro re-education  Decreased function - therapeutic activities    Therapy Evaluation Codes:   1) Clinical presentation characteristics are:   Stable/Uncomplicated.  2) Decision-Making    Low complexity using standardized patient assessment instrument and/or measureable assessment of functional outcome.  Cumulative Therapy Evaluation is: Low complexity.    Previous and current functional limitations:  (See Goal Flow Sheet for this information)    Short term and Long term goals: (See Goal Flow Sheet for this information)     Communication ability:  Patient appears to be able to clearly communicate and understand verbal and written communication and follow directions correctly.  Treatment Explanation - The following has been discussed with the patient:   RX ordered/plan of care  Anticipated outcomes  Possible risks and side effects  This patient would benefit from PT intervention to resume normal activities.   Rehab potential is good.    Frequency:  2 X week, once daily  Duration:  for 8 weeks  Discharge Plan:  Achieve all LTG.  Independent in home treatment program.  Reach maximal therapeutic benefit.    Please refer to the daily flowsheet for treatment today, total treatment time and time spent performing 1:1 timed codes.

## 2022-10-20 ENCOUNTER — THERAPY VISIT (OUTPATIENT)
Dept: PHYSICAL THERAPY | Facility: CLINIC | Age: 57
End: 2022-10-20
Payer: COMMERCIAL

## 2022-10-20 DIAGNOSIS — M25.562 ACUTE PAIN OF LEFT KNEE: ICD-10-CM

## 2022-10-20 DIAGNOSIS — Z47.1 AFTERCARE FOLLOWING LEFT KNEE JOINT REPLACEMENT SURGERY: Primary | ICD-10-CM

## 2022-10-20 DIAGNOSIS — Z96.652 AFTERCARE FOLLOWING LEFT KNEE JOINT REPLACEMENT SURGERY: Primary | ICD-10-CM

## 2022-10-20 DIAGNOSIS — Z47.89 AFTERCARE FOLLOWING SURGERY OF THE MUSCULOSKELETAL SYSTEM: ICD-10-CM

## 2022-10-20 PROCEDURE — 97110 THERAPEUTIC EXERCISES: CPT | Mod: GP | Performed by: PHYSICAL THERAPIST

## 2022-10-20 PROCEDURE — 97010 HOT OR COLD PACKS THERAPY: CPT | Mod: GP | Performed by: PHYSICAL THERAPIST

## 2022-10-24 ENCOUNTER — THERAPY VISIT (OUTPATIENT)
Dept: PHYSICAL THERAPY | Facility: CLINIC | Age: 57
End: 2022-10-24
Payer: COMMERCIAL

## 2022-10-24 DIAGNOSIS — Z47.89 AFTERCARE FOLLOWING SURGERY OF THE MUSCULOSKELETAL SYSTEM: ICD-10-CM

## 2022-10-24 DIAGNOSIS — Z96.652 AFTERCARE FOLLOWING LEFT KNEE JOINT REPLACEMENT SURGERY: Primary | ICD-10-CM

## 2022-10-24 DIAGNOSIS — M25.562 ACUTE PAIN OF LEFT KNEE: ICD-10-CM

## 2022-10-24 DIAGNOSIS — Z47.1 AFTERCARE FOLLOWING LEFT KNEE JOINT REPLACEMENT SURGERY: Primary | ICD-10-CM

## 2022-10-24 PROCEDURE — 97110 THERAPEUTIC EXERCISES: CPT | Mod: GP | Performed by: PHYSICAL THERAPIST

## 2022-10-24 PROCEDURE — 97112 NEUROMUSCULAR REEDUCATION: CPT | Mod: GP | Performed by: PHYSICAL THERAPIST

## 2022-10-27 ENCOUNTER — THERAPY VISIT (OUTPATIENT)
Dept: PHYSICAL THERAPY | Facility: CLINIC | Age: 57
End: 2022-10-27
Payer: COMMERCIAL

## 2022-10-27 DIAGNOSIS — Z47.1 AFTERCARE FOLLOWING LEFT KNEE JOINT REPLACEMENT SURGERY: Primary | ICD-10-CM

## 2022-10-27 DIAGNOSIS — Z96.652 AFTERCARE FOLLOWING LEFT KNEE JOINT REPLACEMENT SURGERY: Primary | ICD-10-CM

## 2022-10-27 DIAGNOSIS — M25.562 ACUTE PAIN OF LEFT KNEE: ICD-10-CM

## 2022-10-27 DIAGNOSIS — Z47.89 AFTERCARE FOLLOWING SURGERY OF THE MUSCULOSKELETAL SYSTEM: ICD-10-CM

## 2022-10-27 PROCEDURE — 97110 THERAPEUTIC EXERCISES: CPT | Mod: GP | Performed by: PHYSICAL THERAPIST

## 2022-10-27 PROCEDURE — 97112 NEUROMUSCULAR REEDUCATION: CPT | Mod: GP | Performed by: PHYSICAL THERAPIST

## 2022-11-07 ENCOUNTER — THERAPY VISIT (OUTPATIENT)
Dept: PHYSICAL THERAPY | Facility: CLINIC | Age: 57
End: 2022-11-07
Payer: COMMERCIAL

## 2022-11-07 DIAGNOSIS — M25.562 ACUTE PAIN OF LEFT KNEE: ICD-10-CM

## 2022-11-07 DIAGNOSIS — Z47.1 AFTERCARE FOLLOWING LEFT KNEE JOINT REPLACEMENT SURGERY: Primary | ICD-10-CM

## 2022-11-07 DIAGNOSIS — Z96.652 AFTERCARE FOLLOWING LEFT KNEE JOINT REPLACEMENT SURGERY: Primary | ICD-10-CM

## 2022-11-07 DIAGNOSIS — Z47.89 AFTERCARE FOLLOWING SURGERY OF THE MUSCULOSKELETAL SYSTEM: ICD-10-CM

## 2022-11-07 PROCEDURE — 97112 NEUROMUSCULAR REEDUCATION: CPT | Mod: GP | Performed by: PHYSICAL THERAPIST

## 2022-11-07 PROCEDURE — 97110 THERAPEUTIC EXERCISES: CPT | Mod: GP | Performed by: PHYSICAL THERAPIST

## 2022-11-07 PROCEDURE — 97010 HOT OR COLD PACKS THERAPY: CPT | Mod: GP | Performed by: PHYSICAL THERAPIST

## 2022-11-10 ENCOUNTER — THERAPY VISIT (OUTPATIENT)
Dept: PHYSICAL THERAPY | Facility: CLINIC | Age: 57
End: 2022-11-10
Payer: COMMERCIAL

## 2022-11-10 DIAGNOSIS — Z47.1 AFTERCARE FOLLOWING LEFT KNEE JOINT REPLACEMENT SURGERY: Primary | ICD-10-CM

## 2022-11-10 DIAGNOSIS — Z96.652 AFTERCARE FOLLOWING LEFT KNEE JOINT REPLACEMENT SURGERY: Primary | ICD-10-CM

## 2022-11-10 DIAGNOSIS — Z47.89 AFTERCARE FOLLOWING SURGERY OF THE MUSCULOSKELETAL SYSTEM: ICD-10-CM

## 2022-11-10 DIAGNOSIS — M25.562 ACUTE PAIN OF LEFT KNEE: ICD-10-CM

## 2022-11-10 PROCEDURE — 97110 THERAPEUTIC EXERCISES: CPT | Mod: GP | Performed by: PHYSICAL THERAPIST

## 2022-11-10 PROCEDURE — 97140 MANUAL THERAPY 1/> REGIONS: CPT | Mod: GP | Performed by: PHYSICAL THERAPIST

## 2022-11-10 PROCEDURE — 97010 HOT OR COLD PACKS THERAPY: CPT | Mod: GP | Performed by: PHYSICAL THERAPIST

## 2022-11-10 PROCEDURE — 97112 NEUROMUSCULAR REEDUCATION: CPT | Mod: GP | Performed by: PHYSICAL THERAPIST

## 2022-11-14 ENCOUNTER — THERAPY VISIT (OUTPATIENT)
Dept: PHYSICAL THERAPY | Facility: CLINIC | Age: 57
End: 2022-11-14
Payer: COMMERCIAL

## 2022-11-14 DIAGNOSIS — Z47.89 AFTERCARE FOLLOWING SURGERY OF THE MUSCULOSKELETAL SYSTEM: ICD-10-CM

## 2022-11-14 DIAGNOSIS — Z96.652 AFTERCARE FOLLOWING LEFT KNEE JOINT REPLACEMENT SURGERY: Primary | ICD-10-CM

## 2022-11-14 DIAGNOSIS — Z47.1 AFTERCARE FOLLOWING LEFT KNEE JOINT REPLACEMENT SURGERY: Primary | ICD-10-CM

## 2022-11-14 DIAGNOSIS — M25.562 ACUTE PAIN OF LEFT KNEE: ICD-10-CM

## 2022-11-14 PROCEDURE — 97112 NEUROMUSCULAR REEDUCATION: CPT | Mod: GP | Performed by: PHYSICAL THERAPIST

## 2022-11-14 PROCEDURE — 97010 HOT OR COLD PACKS THERAPY: CPT | Mod: GP | Performed by: PHYSICAL THERAPIST

## 2022-11-14 PROCEDURE — 97110 THERAPEUTIC EXERCISES: CPT | Mod: GP | Performed by: PHYSICAL THERAPIST

## 2022-11-17 ENCOUNTER — THERAPY VISIT (OUTPATIENT)
Dept: PHYSICAL THERAPY | Facility: CLINIC | Age: 57
End: 2022-11-17
Payer: COMMERCIAL

## 2022-11-17 DIAGNOSIS — Z47.89 AFTERCARE FOLLOWING SURGERY OF THE MUSCULOSKELETAL SYSTEM: ICD-10-CM

## 2022-11-17 DIAGNOSIS — M25.562 ACUTE PAIN OF LEFT KNEE: ICD-10-CM

## 2022-11-17 DIAGNOSIS — Z96.652 AFTERCARE FOLLOWING LEFT KNEE JOINT REPLACEMENT SURGERY: Primary | ICD-10-CM

## 2022-11-17 DIAGNOSIS — Z47.1 AFTERCARE FOLLOWING LEFT KNEE JOINT REPLACEMENT SURGERY: Primary | ICD-10-CM

## 2022-11-17 PROCEDURE — 97112 NEUROMUSCULAR REEDUCATION: CPT | Mod: GP | Performed by: PHYSICAL THERAPIST

## 2022-11-17 PROCEDURE — 97010 HOT OR COLD PACKS THERAPY: CPT | Mod: GP | Performed by: PHYSICAL THERAPIST

## 2022-11-17 PROCEDURE — 97110 THERAPEUTIC EXERCISES: CPT | Mod: GP | Performed by: PHYSICAL THERAPIST

## 2022-11-21 ENCOUNTER — THERAPY VISIT (OUTPATIENT)
Dept: PHYSICAL THERAPY | Facility: CLINIC | Age: 57
End: 2022-11-21
Payer: COMMERCIAL

## 2022-11-21 ENCOUNTER — HEALTH MAINTENANCE LETTER (OUTPATIENT)
Age: 57
End: 2022-11-21

## 2022-11-21 DIAGNOSIS — Z47.89 AFTERCARE FOLLOWING SURGERY OF THE MUSCULOSKELETAL SYSTEM: ICD-10-CM

## 2022-11-21 DIAGNOSIS — Z47.1 AFTERCARE FOLLOWING LEFT KNEE JOINT REPLACEMENT SURGERY: Primary | ICD-10-CM

## 2022-11-21 DIAGNOSIS — Z96.652 AFTERCARE FOLLOWING LEFT KNEE JOINT REPLACEMENT SURGERY: Primary | ICD-10-CM

## 2022-11-21 DIAGNOSIS — M25.562 ACUTE PAIN OF LEFT KNEE: ICD-10-CM

## 2022-11-21 PROCEDURE — 97112 NEUROMUSCULAR REEDUCATION: CPT | Mod: GP | Performed by: PHYSICAL THERAPIST

## 2022-11-21 PROCEDURE — 97010 HOT OR COLD PACKS THERAPY: CPT | Mod: GP | Performed by: PHYSICAL THERAPIST

## 2022-11-21 PROCEDURE — 97110 THERAPEUTIC EXERCISES: CPT | Mod: GP | Performed by: PHYSICAL THERAPIST

## 2022-11-25 ENCOUNTER — E-VISIT (OUTPATIENT)
Dept: URGENT CARE | Facility: CLINIC | Age: 57
End: 2022-11-25
Payer: COMMERCIAL

## 2022-11-25 DIAGNOSIS — H60.332 ACUTE SWIMMER'S EAR OF LEFT SIDE: Primary | ICD-10-CM

## 2022-11-25 PROCEDURE — 99421 OL DIG E/M SVC 5-10 MIN: CPT | Performed by: PHYSICIAN ASSISTANT

## 2022-11-25 RX ORDER — NEOMYCIN SULFATE, POLYMYXIN B SULFATE AND HYDROCORTISONE 10; 3.5; 1 MG/ML; MG/ML; [USP'U]/ML
4 SUSPENSION/ DROPS AURICULAR (OTIC) 4 TIMES DAILY
Qty: 6 ML | Refills: 0 | Status: SHIPPED | OUTPATIENT
Start: 2022-11-25 | End: 2022-12-02

## 2022-11-25 NOTE — PATIENT INSTRUCTIONS
Dear Lavell Mercer    I sent in drops to help. Follow-up in person if not improving in 1 week.     Thanks for choosing us as your health care partner,    Jhonatan Darnell PA-C

## 2022-11-28 ENCOUNTER — THERAPY VISIT (OUTPATIENT)
Dept: PHYSICAL THERAPY | Facility: CLINIC | Age: 57
End: 2022-11-28
Payer: COMMERCIAL

## 2022-11-28 DIAGNOSIS — Z96.652 AFTERCARE FOLLOWING LEFT KNEE JOINT REPLACEMENT SURGERY: Primary | ICD-10-CM

## 2022-11-28 DIAGNOSIS — M25.562 ACUTE PAIN OF LEFT KNEE: ICD-10-CM

## 2022-11-28 DIAGNOSIS — Z47.1 AFTERCARE FOLLOWING LEFT KNEE JOINT REPLACEMENT SURGERY: Primary | ICD-10-CM

## 2022-11-28 DIAGNOSIS — Z47.89 AFTERCARE FOLLOWING SURGERY OF THE MUSCULOSKELETAL SYSTEM: ICD-10-CM

## 2022-11-28 PROCEDURE — 97112 NEUROMUSCULAR REEDUCATION: CPT | Mod: GP | Performed by: PHYSICAL THERAPIST

## 2022-11-28 PROCEDURE — 97110 THERAPEUTIC EXERCISES: CPT | Mod: GP | Performed by: PHYSICAL THERAPIST

## 2022-12-07 ENCOUNTER — THERAPY VISIT (OUTPATIENT)
Dept: PHYSICAL THERAPY | Facility: CLINIC | Age: 57
End: 2022-12-07
Payer: COMMERCIAL

## 2022-12-07 DIAGNOSIS — Z96.652 AFTERCARE FOLLOWING LEFT KNEE JOINT REPLACEMENT SURGERY: Primary | ICD-10-CM

## 2022-12-07 DIAGNOSIS — Z47.1 AFTERCARE FOLLOWING LEFT KNEE JOINT REPLACEMENT SURGERY: Primary | ICD-10-CM

## 2022-12-07 DIAGNOSIS — M25.562 ACUTE PAIN OF LEFT KNEE: ICD-10-CM

## 2022-12-07 DIAGNOSIS — Z47.89 AFTERCARE FOLLOWING SURGERY OF THE MUSCULOSKELETAL SYSTEM: ICD-10-CM

## 2022-12-07 PROCEDURE — 97110 THERAPEUTIC EXERCISES: CPT | Mod: GP | Performed by: PHYSICAL THERAPIST

## 2022-12-07 PROCEDURE — 97112 NEUROMUSCULAR REEDUCATION: CPT | Mod: GP | Performed by: PHYSICAL THERAPIST

## 2022-12-12 ENCOUNTER — THERAPY VISIT (OUTPATIENT)
Dept: PHYSICAL THERAPY | Facility: CLINIC | Age: 57
End: 2022-12-12
Payer: COMMERCIAL

## 2022-12-12 DIAGNOSIS — Z47.1 AFTERCARE FOLLOWING LEFT KNEE JOINT REPLACEMENT SURGERY: Primary | ICD-10-CM

## 2022-12-12 DIAGNOSIS — Z96.652 AFTERCARE FOLLOWING LEFT KNEE JOINT REPLACEMENT SURGERY: Primary | ICD-10-CM

## 2022-12-12 DIAGNOSIS — M25.562 ACUTE PAIN OF LEFT KNEE: ICD-10-CM

## 2022-12-12 DIAGNOSIS — Z47.89 AFTERCARE FOLLOWING SURGERY OF THE MUSCULOSKELETAL SYSTEM: ICD-10-CM

## 2022-12-12 PROCEDURE — 97112 NEUROMUSCULAR REEDUCATION: CPT | Mod: GP | Performed by: PHYSICAL THERAPIST

## 2022-12-12 PROCEDURE — 97110 THERAPEUTIC EXERCISES: CPT | Mod: GP | Performed by: PHYSICAL THERAPIST

## 2022-12-12 ASSESSMENT — ACTIVITIES OF DAILY LIVING (ADL)
HOW_WOULD_YOU_RATE_THE_CURRENT_FUNCTION_OF_YOUR_KNEE_DURING_YOUR_USUAL_DAILY_ACTIVITIES_ON_A_SCALE_FROM_0_TO_100_WITH_100_BEING_YOUR_LEVEL_OF_KNEE_FUNCTION_PRIOR_TO_YOUR_INJURY_AND_0_BEING_THE_INABILITY_TO_PERFORM_ANY_OF_YOUR_USUAL_DAILY_ACTIVITIES?: 80
SIT WITH YOUR KNEE BENT: ACTIVITY IS NOT DIFFICULT
KNEE_ACTIVITY_OF_DAILY_LIVING_SUM: 54
WEAKNESS: I HAVE THE SYMPTOM BUT IT DOES NOT AFFECT MY ACTIVITY
GO UP STAIRS: ACTIVITY IS MINIMALLY DIFFICULT
PAIN: I HAVE THE SYMPTOM BUT IT DOES NOT AFFECT MY ACTIVITY
KNEE_ACTIVITY_OF_DAILY_LIVING_SCORE: 77.14
SQUAT: ACTIVITY IS SOMEWHAT DIFFICULT
GIVING WAY, BUCKLING OR SHIFTING OF KNEE: I HAVE THE SYMPTOM BUT IT DOES NOT AFFECT MY ACTIVITY
STIFFNESS: I HAVE THE SYMPTOM BUT IT DOES NOT AFFECT MY ACTIVITY
GO DOWN STAIRS: ACTIVITY IS MINIMALLY DIFFICULT
WALK: ACTIVITY IS MINIMALLY DIFFICULT
KNEEL ON THE FRONT OF YOUR KNEE: ACTIVITY IS VERY DIFFICULT
STAND: ACTIVITY IS MINIMALLY DIFFICULT
RISE FROM A CHAIR: ACTIVITY IS NOT DIFFICULT
RAW_SCORE: 54
HOW_WOULD_YOU_RATE_THE_OVERALL_FUNCTION_OF_YOUR_KNEE_DURING_YOUR_USUAL_DAILY_ACTIVITIES?: NEARLY NORMAL
SWELLING: I HAVE THE SYMPTOM BUT IT DOES NOT AFFECT MY ACTIVITY
AS_A_RESULT_OF_YOUR_KNEE_INJURY,_HOW_WOULD_YOU_RATE_YOUR_CURRENT_LEVEL_OF_DAILY_ACTIVITY?: NEARLY NORMAL
LIMPING: I HAVE THE SYMPTOM BUT IT DOES NOT AFFECT MY ACTIVITY

## 2022-12-12 NOTE — PROGRESS NOTES
DISCHARGE REPORT    Progress reporting period is from eval to 12/12/22.       SUBJECTIVE  Subjective changes noted by patient:  .  Subjective: He reports functioning at 80% of where he wants to be.    Current pain level is  Current Pain level: 2/10.     Previous pain level was   Initial Pain level: 6/10.   Changes in function:  Yes (See Goal flowsheet attached for changes in current functional level)  Adverse reaction to treatment or activity: None    OBJECTIVE  Changes noted in objective findings:  Yes,   Objective: ROM 0-0-130,  strength 4/5 generally     ASSESSMENT/PLAN  Updated problem list and treatment plan: Diagnosis 1:  S/p L TKA  Decreased strength - therapeutic exercise and therapeutic activities  Impaired muscle performance - neuro re-education  Decreased function - therapeutic activities  STG/LTGs have been met or progress has been made towards goals:  Yes (See Goal flow sheet completed today.)  Assessment of Progress: The patient's condition is improving.  The patient's condition has potential to improve.  Self Management Plans:  Patient has been instructed in a home treatment program.  Patient is independent in a home treatment program.  I have re-evaluated this patient and find that the nature, scope, duration and intensity of the therapy is appropriate for the medical condition of the patient.      Recommendations:  This patient is ready to be discharged from therapy and continue their home treatment program.    Please refer to the daily flowsheet for treatment today, total treatment time and time spent performing 1:1 timed codes.

## 2023-11-25 ENCOUNTER — HEALTH MAINTENANCE LETTER (OUTPATIENT)
Age: 58
End: 2023-11-25

## 2025-01-04 ENCOUNTER — HEALTH MAINTENANCE LETTER (OUTPATIENT)
Age: 60
End: 2025-01-04

## (undated) DEVICE — Device

## (undated) DEVICE — BONE CEMENT MIXEVAC III HI VAC KIT  0206-015-000

## (undated) DEVICE — LINEN FULL SHEET 5511

## (undated) DEVICE — BLADE SAW SAGITTAL STRK 18X90X1.27MM HD SYS 6 6118-127-090

## (undated) DEVICE — SU STRATAFIX PDS PLUS 1 CT-1 12" SXPP1A443

## (undated) DEVICE — PACK TOTAL KNEE BOXED LATEX FREE PO15TKFCT

## (undated) DEVICE — TOURNIQUET SGL  BLADDER 30"X4" BLUE 5921030135

## (undated) DEVICE — SU MONOCRYL 4-0 PS-2 27" UND Y426H

## (undated) DEVICE — BAG CLEAR TRASH 1.3M 39X33" P4040C

## (undated) DEVICE — GLOVE PROTEXIS BLUE W/NEU-THERA 7.5  2D73EB75

## (undated) DEVICE — CAST PADDING 6" STERILE 9046S

## (undated) DEVICE — PREP CHLORAPREP 26ML TINTED HI-LITE ORANGE 930815

## (undated) DEVICE — SUTURE VICRYL+ 2-0 CT-2 27" UND VCP269H

## (undated) DEVICE — LINEN ORTHO ACL PACK 5447

## (undated) DEVICE — SU VICRYL+ 1 MO-4 18" DYED VCP702D

## (undated) DEVICE — SUCTION MANIFOLD NEPTUNE 2 SYS 4 PORT 0702-020-000

## (undated) DEVICE — GLOVE PROTEXIS POWDER FREE 7.5 ORTHOPEDIC 2D73ET75

## (undated) DEVICE — SET HANDPIECE INTERPULSE W/COAXIAL FAN SPRAY TIP 0210118000

## (undated) DEVICE — DRSG AQUACEL AG HYDROFIBER  3.5X10" 422605

## (undated) DEVICE — GLOVE PROTEXIS POWDER FREE 8.0 ORTHOPEDIC 2D73ET80

## (undated) DEVICE — SOL NACL 0.9% IRRIG 3000ML BAG 2B7477

## (undated) DEVICE — GLOVE PROTEXIS BLUE W/NEU-THERA 8.0  2D73EB80

## (undated) RX ORDER — VERAPAMIL HYDROCHLORIDE 2.5 MG/ML
INJECTION, SOLUTION INTRAVENOUS
Status: DISPENSED
Start: 2018-02-12

## (undated) RX ORDER — FENTANYL CITRATE 50 UG/ML
INJECTION, SOLUTION INTRAMUSCULAR; INTRAVENOUS
Status: DISPENSED
Start: 2022-10-14

## (undated) RX ORDER — HYDROXYZINE HYDROCHLORIDE 25 MG/1
TABLET, FILM COATED ORAL
Status: DISPENSED
Start: 2022-10-14

## (undated) RX ORDER — LIDOCAINE HYDROCHLORIDE 10 MG/ML
INJECTION, SOLUTION EPIDURAL; INFILTRATION; INTRACAUDAL; PERINEURAL
Status: DISPENSED
Start: 2018-02-12

## (undated) RX ORDER — CLOPIDOGREL 300 MG/1
TABLET, FILM COATED ORAL
Status: DISPENSED
Start: 2018-02-12

## (undated) RX ORDER — GLYCOPYRROLATE 0.2 MG/ML
INJECTION INTRAMUSCULAR; INTRAVENOUS
Status: DISPENSED
Start: 2022-10-14

## (undated) RX ORDER — HEPARIN SODIUM 1000 [USP'U]/ML
INJECTION, SOLUTION INTRAVENOUS; SUBCUTANEOUS
Status: DISPENSED
Start: 2018-02-12

## (undated) RX ORDER — PROPOFOL 10 MG/ML
INJECTION, EMULSION INTRAVENOUS
Status: DISPENSED
Start: 2022-10-14

## (undated) RX ORDER — TRANEXAMIC ACID 10 MG/ML
INJECTION, SOLUTION INTRAVENOUS
Status: DISPENSED
Start: 2022-10-14

## (undated) RX ORDER — NEOSTIGMINE METHYLSULFATE 1 MG/ML
VIAL (ML) INJECTION
Status: DISPENSED
Start: 2022-10-14

## (undated) RX ORDER — LABETALOL HYDROCHLORIDE 5 MG/ML
INJECTION, SOLUTION INTRAVENOUS
Status: DISPENSED
Start: 2022-10-14

## (undated) RX ORDER — ACETAMINOPHEN 325 MG/1
TABLET ORAL
Status: DISPENSED
Start: 2022-10-14

## (undated) RX ORDER — CEFAZOLIN SODIUM/WATER 2 G/20 ML
SYRINGE (ML) INTRAVENOUS
Status: DISPENSED
Start: 2022-10-14

## (undated) RX ORDER — DEXAMETHASONE SODIUM PHOSPHATE 4 MG/ML
INJECTION, SOLUTION INTRA-ARTICULAR; INTRALESIONAL; INTRAMUSCULAR; INTRAVENOUS; SOFT TISSUE
Status: DISPENSED
Start: 2022-10-14

## (undated) RX ORDER — FENTANYL CITRATE 50 UG/ML
INJECTION, SOLUTION INTRAMUSCULAR; INTRAVENOUS
Status: DISPENSED
Start: 2018-02-12

## (undated) RX ORDER — OXYCODONE HYDROCHLORIDE 5 MG/1
TABLET ORAL
Status: DISPENSED
Start: 2022-10-14

## (undated) RX ORDER — ONDANSETRON 2 MG/ML
INJECTION INTRAMUSCULAR; INTRAVENOUS
Status: DISPENSED
Start: 2022-10-14

## (undated) RX ORDER — KETOROLAC TROMETHAMINE 15 MG/ML
INJECTION, SOLUTION INTRAMUSCULAR; INTRAVENOUS
Status: DISPENSED
Start: 2022-10-14

## (undated) RX ORDER — LIDOCAINE HYDROCHLORIDE 10 MG/ML
INJECTION, SOLUTION EPIDURAL; INFILTRATION; INTRACAUDAL; PERINEURAL
Status: DISPENSED
Start: 2022-10-14

## (undated) RX ORDER — EPHEDRINE SULFATE 50 MG/ML
INJECTION, SOLUTION INTRAMUSCULAR; INTRAVENOUS; SUBCUTANEOUS
Status: DISPENSED
Start: 2022-10-14

## (undated) RX ORDER — VANCOMYCIN HYDROCHLORIDE 1 G/20ML
INJECTION, POWDER, LYOPHILIZED, FOR SOLUTION INTRAVENOUS
Status: DISPENSED
Start: 2022-10-14